# Patient Record
Sex: FEMALE | Race: WHITE | NOT HISPANIC OR LATINO | Employment: FULL TIME | URBAN - METROPOLITAN AREA
[De-identification: names, ages, dates, MRNs, and addresses within clinical notes are randomized per-mention and may not be internally consistent; named-entity substitution may affect disease eponyms.]

---

## 2023-11-04 PROBLEM — R92.2 DENSE BREASTS: Status: ACTIVE | Noted: 2023-11-04

## 2023-11-04 PROBLEM — D22.5 MELANOCYTIC NEVI OF TRUNK: Status: ACTIVE | Noted: 2018-03-05

## 2023-11-04 PROBLEM — R92.30 DENSE BREASTS: Status: ACTIVE | Noted: 2023-11-04

## 2023-11-04 PROBLEM — L81.4 OTHER MELANIN HYPERPIGMENTATION: Status: ACTIVE | Noted: 2018-03-05

## 2023-11-04 PROBLEM — N92.6 IRREGULAR MENSES: Status: ACTIVE | Noted: 2023-11-04

## 2023-11-04 PROBLEM — E55.9 VITAMIN D DEFICIENCY: Status: ACTIVE | Noted: 2023-11-04

## 2023-11-04 PROBLEM — D18.01 HEMANGIOMA OF SKIN AND SUBCUTANEOUS TISSUE: Status: ACTIVE | Noted: 2018-03-05

## 2023-11-04 PROBLEM — G47.33 OBSTRUCTIVE SLEEP APNEA: Status: ACTIVE | Noted: 2023-11-04

## 2023-11-04 PROBLEM — Z98.890 HISTORY OF COLPOSCOPY: Status: ACTIVE | Noted: 2023-11-04

## 2023-11-04 PROBLEM — H60.91 RIGHT OTITIS EXTERNA: Status: ACTIVE | Noted: 2023-11-04

## 2023-11-04 PROBLEM — D22.30 MELANOCYTIC NEVI OF UNSPECIFIED PART OF FACE: Status: ACTIVE | Noted: 2018-03-05

## 2023-11-04 PROBLEM — R87.610 PAP SMEAR ABNORMALITY OF CERVIX WITH ASCUS FAVORING BENIGN: Status: ACTIVE | Noted: 2023-11-04

## 2023-11-04 PROBLEM — H92.03 OTALGIA OF BOTH EARS: Status: ACTIVE | Noted: 2023-11-04

## 2023-11-04 PROBLEM — L82.1 OTHER SEBORRHEIC KERATOSIS: Status: ACTIVE | Noted: 2018-03-05

## 2023-11-04 PROBLEM — D22.62 MELANOCYTIC NEVI OF LEFT UPPER LIMB, INCLUDING SHOULDER: Status: ACTIVE | Noted: 2018-03-05

## 2023-11-04 PROBLEM — D22.72 MELANOCYTIC NEVI OF LEFT LOWER LIMB, INCLUDING HIP: Status: ACTIVE | Noted: 2018-03-05

## 2023-11-04 PROBLEM — L91.8 OTHER HYPERTROPHIC DISORDERS OF THE SKIN: Status: ACTIVE | Noted: 2018-03-05

## 2023-11-04 PROBLEM — H92.09 OTALGIA: Status: ACTIVE | Noted: 2023-11-04

## 2023-11-04 PROBLEM — B97.7 HPV IN FEMALE: Status: ACTIVE | Noted: 2023-11-04

## 2023-11-04 PROBLEM — H93.293 OTHER ABNORMAL AUDITORY PERCEPTIONS, BILATERAL: Status: ACTIVE | Noted: 2023-11-04

## 2023-11-04 PROBLEM — R23.2 HOT FLASHES: Status: ACTIVE | Noted: 2023-11-04

## 2023-11-04 PROBLEM — E61.1 LOW IRON: Status: ACTIVE | Noted: 2023-11-04

## 2023-11-04 PROBLEM — E66.9 OBESITY: Status: ACTIVE | Noted: 2023-11-04

## 2023-11-04 RX ORDER — SOD SULF/POT CHLORIDE/MAG SULF 1.479 G
TABLET ORAL
COMMUNITY
Start: 2022-09-12 | End: 2023-11-06 | Stop reason: WASHOUT

## 2023-11-06 ENCOUNTER — OFFICE VISIT (OUTPATIENT)
Dept: OBSTETRICS AND GYNECOLOGY | Facility: CLINIC | Age: 53
End: 2023-11-06
Payer: COMMERCIAL

## 2023-11-06 VITALS
DIASTOLIC BLOOD PRESSURE: 68 MMHG | HEIGHT: 61 IN | SYSTOLIC BLOOD PRESSURE: 106 MMHG | WEIGHT: 199 LBS | BODY MASS INDEX: 37.57 KG/M2

## 2023-11-06 DIAGNOSIS — Z12.4 CERVICAL CANCER SCREENING: ICD-10-CM

## 2023-11-06 DIAGNOSIS — Z12.31 VISIT FOR SCREENING MAMMOGRAM: ICD-10-CM

## 2023-11-06 DIAGNOSIS — Z01.419 WELL WOMAN EXAM WITH ROUTINE GYNECOLOGICAL EXAM: Primary | ICD-10-CM

## 2023-11-06 PROCEDURE — 1036F TOBACCO NON-USER: CPT | Performed by: NURSE PRACTITIONER

## 2023-11-06 PROCEDURE — 87624 HPV HI-RISK TYP POOLED RSLT: CPT

## 2023-11-06 PROCEDURE — 88175 CYTOPATH C/V AUTO FLUID REDO: CPT

## 2023-11-06 PROCEDURE — 88141 CYTOPATH C/V INTERPRET: CPT | Performed by: PATHOLOGY

## 2023-11-06 PROCEDURE — 99396 PREV VISIT EST AGE 40-64: CPT | Performed by: NURSE PRACTITIONER

## 2023-11-06 ASSESSMENT — ENCOUNTER SYMPTOMS
PSYCHIATRIC NEGATIVE: 1
NEUROLOGICAL NEGATIVE: 1
GASTROINTESTINAL NEGATIVE: 1
CARDIOVASCULAR NEGATIVE: 1
ENDOCRINE NEGATIVE: 1
RESPIRATORY NEGATIVE: 1
ALLERGIC/IMMUNOLOGIC NEGATIVE: 1
HEMATOLOGIC/LYMPHATIC NEGATIVE: 1
MUSCULOSKELETAL NEGATIVE: 1
EYES NEGATIVE: 1
UNEXPECTED WEIGHT CHANGE: 1
ENDOCRINE COMMENTS: +HOT FLASHES

## 2023-11-06 NOTE — PROGRESS NOTES
"Chief Complaint    Annual Exam        HPI    ANNUAL -NO CONCERNS    PAP -10/01/2020 NEG / HPV NEG  MAMMO - 2023  DEXA - NEVER  COLON - 11/15/2022  Last edited by Miesha Hair MA on 2023  8:59 AM.        53 y.o.  female presents for annual well woman exam.   Last menstrual cycle was in 2022.   Menopausal symptoms include hot flashes. She reports they are manageable and not affecting her daily living. She is not on hormone replacement therapy.  Denies any post-menopausal vaginal bleeding.  She is , currently sexually active. Denies dyspareunia.    She denies any breast changes.   H/O abnormal pap ASCUS, +HPV-16 with negative colposcopy in 10/2019. Last pap: 10/2020 and was negative and negative HPV.   Denies pelvic pain.   Denies abnormal vaginal discharge, itching, odor.   No urinary or bowel concerns. Colonoscopy: 2022 which showed polyps and is due to return at 3 years.   She is a non-smoker.   PMH: None  Family h/o breast cancer: None  Family h/o GYN cancer: None  Family h/o colon cancer: None  Works in IT, from home.     /68   Ht 1.549 m (5' 1\")   Wt 90.3 kg (199 lb)   LMP 2022   BMI 37.60 kg/m²      Review of Systems   Constitutional:  Positive for unexpected weight change (weight gain).   HENT: Negative.     Eyes: Negative.    Respiratory: Negative.     Cardiovascular: Negative.    Gastrointestinal: Negative.    Endocrine: Negative.         +hot flashes   Genitourinary: Negative.    Musculoskeletal: Negative.    Skin: Negative.    Allergic/Immunologic: Negative.    Neurological: Negative.    Hematological: Negative.    Psychiatric/Behavioral: Negative.     All other systems reviewed and are negative.       Physical Exam  Constitutional:       Appearance: Normal appearance.   HENT:      Head: Normocephalic.      Nose: Nose normal.   Cardiovascular:      Rate and Rhythm: Normal rate and regular rhythm.   Pulmonary:      Effort: Pulmonary effort is normal.      " Breath sounds: Normal breath sounds.   Chest:   Breasts:     Right: Normal.      Left: Normal.   Abdominal:      General: Abdomen is flat.      Palpations: Abdomen is soft.   Genitourinary:     General: Normal vulva.      Vagina: Normal.      Cervix: Normal.      Uterus: Normal.       Adnexa: Right adnexa normal and left adnexa normal.      Rectum: Normal.      Comments: Pap collected today  Musculoskeletal:         General: Normal range of motion.      Cervical back: Normal range of motion and neck supple.   Skin:     General: Skin is warm and dry.   Neurological:      Mental Status: She is alert.   Psychiatric:         Mood and Affect: Mood normal.         Behavior: Behavior normal.     1. Well woman exam with routine gynecological exam  -Pap test w/HPV testing collected today.   -Mammogram ordered. Self breast awareness exams reviewed.  -Colonoscopy is up to date.   -Advised yearly well woman exams.   -Follow up sooner if needed.     2. Visit for screening mammogram  - BI mammo bilateral screening tomosynthesis; Future    3. Cervical cancer screening  - THINPREP PAP

## 2023-11-21 LAB
CYTOLOGY CMNT CVX/VAG CYTO-IMP: NORMAL
HPV HR 12 DNA GENITAL QL NAA+PROBE: NEGATIVE
HPV HR GENOTYPES PNL CVX NAA+PROBE: POSITIVE
HPV16 DNA SPEC QL NAA+PROBE: POSITIVE
HPV18 DNA SPEC QL NAA+PROBE: NEGATIVE
LAB AP HPV GENOTYPE QUESTION: YES
LAB AP HPV HR: NORMAL
LABORATORY COMMENT REPORT: NORMAL
LMP START DATE: NORMAL
PATH REPORT.TOTAL CANCER: NORMAL

## 2023-12-07 ENCOUNTER — OFFICE VISIT (OUTPATIENT)
Dept: OBSTETRICS AND GYNECOLOGY | Facility: CLINIC | Age: 53
End: 2023-12-07
Payer: COMMERCIAL

## 2023-12-07 VITALS — DIASTOLIC BLOOD PRESSURE: 66 MMHG | SYSTOLIC BLOOD PRESSURE: 110 MMHG | BODY MASS INDEX: 37.41 KG/M2 | WEIGHT: 198 LBS

## 2023-12-07 DIAGNOSIS — B97.7 HPV IN FEMALE: Primary | ICD-10-CM

## 2023-12-07 PROCEDURE — 1036F TOBACCO NON-USER: CPT | Performed by: OBSTETRICS & GYNECOLOGY

## 2023-12-07 PROCEDURE — 88305 TISSUE EXAM BY PATHOLOGIST: CPT

## 2023-12-07 PROCEDURE — 88305 TISSUE EXAM BY PATHOLOGIST: CPT | Performed by: PATHOLOGY

## 2023-12-07 PROCEDURE — 57456 ENDOCERV CURETTAGE W/SCOPE: CPT | Performed by: OBSTETRICS & GYNECOLOGY

## 2023-12-07 NOTE — PROGRESS NOTES
Patient ID: Mare Hayes is a 53 y.o. female.    Colposcopy    Date/Time: 12/7/2023 12:04 PM    Performed by: Rebecca Valadez DO  Authorized by: Rebecca Valadez DO    Procedure location: cervix    Consent:     Patient questions answered: yes      Risks and benefits of the procedure and its alternatives discussed: yes      Procedural risks discussed:  Bleeding and infection    Consent obtained:  Written    Consent given by:  Patient  Indication:     Cervical indication(s): high-risk HPV positive    Pre-procedure:     Premeds:  Ibuprofen    Speculum was placed in the vagina: yes      Prep solution(s): acetic acid    Procedure:     Colposcopy with: endocervical curettage      Biopsy taken: no      Cervix visibility: fully visualized      SCJ visibility: fully visualized      Lesion visualized: fully visualized      Cervical impression: normal/benign    Post-procedure:     Patient tolerance of procedure:  Patient tolerated the procedure well with no immediate complications    Instructions and paperwork completed: yes      Educational handouts given: yes    Rebecca Valadez DO

## 2023-12-07 NOTE — PATIENT INSTRUCTIONS
Visit for Colposcopy:  You were seen today for colposcopy for abnormal pap smear  Colposcopy is a type of examination that allows for better visualization of abnormal cervical cells, generally caused by infection with Human Papilloma Virus (HPV)  You may have had a biopsy done to better diagnose cervical abnormalities and plan for future management. If so, you may have some spotting to light vaginal bleeding for a few days. Sometimes a medication called Monsel's is used after cervical biopsies to stop bleeding, and this medication often causes a discharge that looks like coffee grounds  If a biopsy was done during your colposcopy you will receive your results by phone/MyChart  Recommendations for follow up pap smears/treatment is dependent on colposcopy/biopsy findings  Monitor for signs of infection and call the office for any fever, chills, severe/worsening pelvic pain, foul smelling vaginal discharge. [phone] [place] [phone]

## 2023-12-11 ENCOUNTER — OFFICE VISIT (OUTPATIENT)
Dept: PRIMARY CARE | Facility: CLINIC | Age: 53
End: 2023-12-11
Payer: COMMERCIAL

## 2023-12-11 VITALS
RESPIRATION RATE: 18 BRPM | BODY MASS INDEX: 37.61 KG/M2 | HEIGHT: 61 IN | DIASTOLIC BLOOD PRESSURE: 72 MMHG | SYSTOLIC BLOOD PRESSURE: 100 MMHG | HEART RATE: 73 BPM | WEIGHT: 199.2 LBS | OXYGEN SATURATION: 98 %

## 2023-12-11 DIAGNOSIS — F32.89 OTHER DEPRESSION: ICD-10-CM

## 2023-12-11 DIAGNOSIS — Z13.220 LIPID SCREENING: ICD-10-CM

## 2023-12-11 DIAGNOSIS — E53.8 VITAMIN B 12 DEFICIENCY: ICD-10-CM

## 2023-12-11 DIAGNOSIS — Z00.00 WELLNESS EXAMINATION: Primary | ICD-10-CM

## 2023-12-11 DIAGNOSIS — E66.9 CLASS 2 OBESITY WITHOUT SERIOUS COMORBIDITY WITH BODY MASS INDEX (BMI) OF 37.0 TO 37.9 IN ADULT, UNSPECIFIED OBESITY TYPE: ICD-10-CM

## 2023-12-11 DIAGNOSIS — E55.9 VITAMIN D DEFICIENCY: ICD-10-CM

## 2023-12-11 DIAGNOSIS — R63.5 WEIGHT GAIN: ICD-10-CM

## 2023-12-11 PROBLEM — H93.293 OTHER ABNORMAL AUDITORY PERCEPTIONS, BILATERAL: Status: RESOLVED | Noted: 2023-11-04 | Resolved: 2023-12-11

## 2023-12-11 PROBLEM — H60.91 RIGHT OTITIS EXTERNA: Status: RESOLVED | Noted: 2023-11-04 | Resolved: 2023-12-11

## 2023-12-11 PROBLEM — H92.03 OTALGIA OF BOTH EARS: Status: RESOLVED | Noted: 2023-11-04 | Resolved: 2023-12-11

## 2023-12-11 PROBLEM — R23.2 HOT FLASHES: Status: RESOLVED | Noted: 2023-11-04 | Resolved: 2023-12-11

## 2023-12-11 PROBLEM — H92.09 OTALGIA: Status: RESOLVED | Noted: 2023-11-04 | Resolved: 2023-12-11

## 2023-12-11 PROCEDURE — 99214 OFFICE O/P EST MOD 30 MIN: CPT | Performed by: NURSE PRACTITIONER

## 2023-12-11 PROCEDURE — 3008F BODY MASS INDEX DOCD: CPT | Performed by: NURSE PRACTITIONER

## 2023-12-11 PROCEDURE — 1036F TOBACCO NON-USER: CPT | Performed by: NURSE PRACTITIONER

## 2023-12-11 RX ORDER — SERTRALINE HYDROCHLORIDE 25 MG/1
25 TABLET, FILM COATED ORAL DAILY
Qty: 30 TABLET | Refills: 1 | Status: SHIPPED | OUTPATIENT
Start: 2023-12-11 | End: 2024-03-21

## 2023-12-11 NOTE — PROGRESS NOTES
"Subjective   Patient ID: Mare Hayes is a 53 y.o. female who presents for Annual Exam (Patient in today for annual CPE, reports no concerns at this time. ).    53-year-old female here for yearly follow-up.  During her visit she started to cry.  She states she has been very irritable and depressed.  She reviewed some of her history with me,  from  and is secondary to him \"cheating on her \".  His new wife recently  from oral cancer.  Patient recently underwent a colposcopy for abnormal Pap positive for HPV.  Now the patient has been upset worried that something is going to come back abnormal especially given the history of her  cheating on her.  Having a hard time looking past this right now  Colonoscopy done for colon cancer screening 2022.  Recommended for 3-year follow-up secondary to abnormal polyp  Mamm-ordered by gynecology for yearly breast cancer screening  Anxiety/depression. With depression being the current issue.  She denies feelings of self-harm or harm to others.  But having a difficult time handling her current health situation  B12- 342 in -not on supplement  Vit D - 40 in            Review of Systems   All other systems reviewed and are negative.      Objective   /72   Pulse 73   Resp 18   Ht 1.549 m (5' 1\")   Wt 90.4 kg (199 lb 3.2 oz)   LMP 2022   SpO2 98%   BMI 37.64 kg/m²     Physical Exam  Vitals and nursing note reviewed.   Constitutional:       General: She is not in acute distress.     Appearance: Normal appearance. She is normal weight.   HENT:      Head: Normocephalic and atraumatic.   Eyes:      Extraocular Movements: Extraocular movements intact.      Conjunctiva/sclera: Conjunctivae normal.      Pupils: Pupils are equal, round, and reactive to light.   Neck:      Vascular: No carotid bruit.   Cardiovascular:      Rate and Rhythm: Normal rate and regular rhythm.      Pulses: Normal pulses.      Heart sounds: Normal heart " sounds.   Pulmonary:      Effort: Pulmonary effort is normal.      Breath sounds: Normal breath sounds.   Abdominal:      General: Bowel sounds are normal. There is no distension.      Palpations: Abdomen is soft.      Tenderness: There is no abdominal tenderness.   Musculoskeletal:         General: Normal range of motion.      Cervical back: Normal range of motion and neck supple.      Right lower leg: No edema.      Left lower leg: No edema.   Lymphadenopathy:      Cervical: No cervical adenopathy.   Skin:     General: Skin is warm and dry.      Capillary Refill: Capillary refill takes less than 2 seconds.   Neurological:      General: No focal deficit present.      Mental Status: She is alert and oriented to person, place, and time.      Motor: No weakness.   Psychiatric:         Behavior: Behavior normal.      Comments: Slightly irritable during the first half of our visit.  Then she started to open up.  Started to cry when she was talking about her health concerns         Assessment/Plan   Diagnoses and all orders for this visit:  Wellness examination  Comments:  Update routine blood work  Orders:  -     Comprehensive Metabolic Panel; Future  -     CBC and Auto Differential; Future  -     Follow Up In Primary Care - Other; Future  Other depression  Comments:  Risk versus benefit discussed possible medications reviewed.  Start sertraline 25 mg p.o. daily follow-up in 1 month  Orders:  -     sertraline (Zoloft) 25 mg tablet; Take 1 tablet (25 mg) by mouth once daily.  -     Thyroid Stimulating Hormone; Future  -     Thyroxine, Free; Future  -     Vitamin D 25-Hydroxy,Total (for eval of Vitamin D levels); Future  Weight gain  Comments:  We talked about getting her mood under control.  Use sertraline for depression has minimal effect on weight gain  Orders:  -     Thyroid Stimulating Hormone; Future  -     Thyroxine, Free; Future  Lipid screening  Comments:  Fasting lipid panel  Orders:  -     Lipid Panel;  Future  Vitamin D deficiency  Comments:  Make sure you are taking vitamin D 5000 units p.o. daily with food  Class 2 obesity without serious comorbidity with body mass index (BMI) of 37.0 to 37.9 in adult, unspecified obesity type  Vitamin B 12 deficiency  Comments:  B12 level is too low.  Should be over 600.  Add daily sublingual vitamin B12 supplement approximately 1000 mcg a day

## 2023-12-11 NOTE — PATIENT INSTRUCTIONS
The Plate Method- this will help with portion control  Protein 50% of your body weight in ounces.   Depression- Start sertraline 25 mg daily  Magnesium Glycinate 1-2 times a day for hot flashes, anxiety.  [employer]   It was nice to meet you!

## 2023-12-15 LAB
LABORATORY COMMENT REPORT: NORMAL
PATH REPORT.FINAL DX SPEC: NORMAL
PATH REPORT.GROSS SPEC: NORMAL
PATH REPORT.RELEVANT HX SPEC: NORMAL
PATH REPORT.TOTAL CANCER: NORMAL

## 2024-01-05 ENCOUNTER — OFFICE VISIT (OUTPATIENT)
Dept: OBSTETRICS AND GYNECOLOGY | Facility: CLINIC | Age: 54
End: 2024-01-05
Payer: COMMERCIAL

## 2024-01-05 VITALS
DIASTOLIC BLOOD PRESSURE: 82 MMHG | BODY MASS INDEX: 37.57 KG/M2 | SYSTOLIC BLOOD PRESSURE: 112 MMHG | HEIGHT: 61 IN | WEIGHT: 199 LBS

## 2024-01-05 DIAGNOSIS — N30.00 ACUTE CYSTITIS WITHOUT HEMATURIA: ICD-10-CM

## 2024-01-05 DIAGNOSIS — R39.15 URINARY URGENCY: ICD-10-CM

## 2024-01-05 DIAGNOSIS — N93.9 ABNORMAL UTERINE BLEEDING (AUB): Primary | ICD-10-CM

## 2024-01-05 LAB
POC APPEARANCE, URINE: CLEAR
POC BILIRUBIN, URINE: NEGATIVE
POC BLOOD, URINE: ABNORMAL
POC COLOR, URINE: YELLOW
POC GLUCOSE, URINE: NEGATIVE MG/DL
POC KETONES, URINE: NEGATIVE MG/DL
POC LEUKOCYTES, URINE: ABNORMAL
POC NITRITE,URINE: NEGATIVE
POC PH, URINE: 7 PH
POC PROTEIN, URINE: ABNORMAL MG/DL
POC SPECIFIC GRAVITY, URINE: 1.01
POC UROBILINOGEN, URINE: 0.2 EU/DL

## 2024-01-05 PROCEDURE — 3008F BODY MASS INDEX DOCD: CPT | Performed by: NURSE PRACTITIONER

## 2024-01-05 PROCEDURE — 99213 OFFICE O/P EST LOW 20 MIN: CPT | Performed by: NURSE PRACTITIONER

## 2024-01-05 PROCEDURE — 87086 URINE CULTURE/COLONY COUNT: CPT

## 2024-01-05 PROCEDURE — 1036F TOBACCO NON-USER: CPT | Performed by: NURSE PRACTITIONER

## 2024-01-05 PROCEDURE — 81002 URINALYSIS NONAUTO W/O SCOPE: CPT | Performed by: NURSE PRACTITIONER

## 2024-01-05 NOTE — PROGRESS NOTES
"Chief Complaint    Vaginal Bleeding        HPI    HAD NOT HAD A PERIOD IN OVER 1 YEAR 2022 AND THEN STARTED BLEEDING AFTER PROCEDURE COLPO NO SPECIMENS TAKEN 23 STARTED BLEEDING BRIGHT RED BLOOD - NO BLEEDING TODAAY   Last edited by Miesha Hair MA on 2024 10:31 AM.         53 y.o.  female who presents for evaluation of abnormal bleeding.  She had went just about 1 year with no period.   Started bleeding on . Has been having daily bleeding since.  Light flow, but bright red. Only needing panty liner.   Reports pelvic pressure and urinary urgency.   Denies abnormal vaginal discharge, itching, or odor.   She is sexually active with . Denies dyspareunia.  She had recent CBC, TSH with PCP which were normal.  Pap in 2023 ASCUS/+HRHPV. Colposcopy on 2023 with Dr. Lopes. ECC negative.   PMH: None  No family h/o GYN related cancers or disorders.     /82   Ht 1.549 m (5' 1\")   Wt 90.3 kg (199 lb)   LMP 2022   BMI 37.60 kg/m²      Review of Systems   Genitourinary:  Positive for pelvic pain, urgency and vaginal bleeding.   All other systems reviewed and are negative.       Physical Exam  Constitutional:       Appearance: Normal appearance.   HENT:      Head: Normocephalic.   Pulmonary:      Effort: Pulmonary effort is normal.   Genitourinary:     General: Normal vulva.      Vagina: Bleeding (Scant) present.      Cervix: Lesion (Small polyp) present.      Uterus: Normal.       Adnexa: Right adnexa normal and left adnexa normal.   Musculoskeletal:         General: Normal range of motion.      Cervical back: Normal range of motion and neck supple.   Skin:     General: Skin is warm and dry.   Neurological:      Mental Status: She is alert.   Psychiatric:         Mood and Affect: Mood normal.         Behavior: Behavior normal.     1. Abnormal uterine bleeding (AUB)  -Discussed possible etiologies of abnormal bleeding in detail including ovulatory " dysfunction/perimenopause, endocrine disorders, hyperplasia, endometrial polyps, malignancy.   -Recent normal CBC, TSH. Prolactin ordered today.     Prolactin; Future  -Ordered: US PELVIS TRANSABDOMINAL WITH TRANSVAGINAL; Future  -Will notify of results.    -Discussed possible need for endometrial biopsy for further evaluation if needed.     2. Urinary urgency  -POCT UA (nonautomated) manually resulted   -Collected: Urine culture  -Will notify of results.

## 2024-01-07 LAB — BACTERIA UR CULT: ABNORMAL

## 2024-01-08 RX ORDER — CEPHALEXIN 500 MG/1
500 CAPSULE ORAL 4 TIMES DAILY
Qty: 20 CAPSULE | Refills: 0 | Status: SHIPPED | OUTPATIENT
Start: 2024-01-08 | End: 2024-01-13

## 2024-01-09 ENCOUNTER — ANCILLARY PROCEDURE (OUTPATIENT)
Dept: RADIOLOGY | Facility: CLINIC | Age: 54
End: 2024-01-09
Payer: COMMERCIAL

## 2024-01-09 DIAGNOSIS — N93.9 ABNORMAL UTERINE BLEEDING (AUB): ICD-10-CM

## 2024-01-09 PROCEDURE — 76856 US EXAM PELVIC COMPLETE: CPT

## 2024-01-09 PROCEDURE — 76830 TRANSVAGINAL US NON-OB: CPT | Performed by: RADIOLOGY

## 2024-01-09 PROCEDURE — 76856 US EXAM PELVIC COMPLETE: CPT | Performed by: RADIOLOGY

## 2024-01-11 ENCOUNTER — APPOINTMENT (OUTPATIENT)
Dept: PRIMARY CARE | Facility: CLINIC | Age: 54
End: 2024-01-11
Payer: COMMERCIAL

## 2024-01-25 ENCOUNTER — HOSPITAL ENCOUNTER (OUTPATIENT)
Dept: RADIOLOGY | Facility: CLINIC | Age: 54
Discharge: HOME | End: 2024-01-25
Payer: COMMERCIAL

## 2024-01-25 DIAGNOSIS — Z12.31 VISIT FOR SCREENING MAMMOGRAM: ICD-10-CM

## 2024-01-25 PROCEDURE — 77063 BREAST TOMOSYNTHESIS BI: CPT | Performed by: RADIOLOGY

## 2024-01-25 PROCEDURE — 77067 SCR MAMMO BI INCL CAD: CPT

## 2024-01-25 PROCEDURE — 77067 SCR MAMMO BI INCL CAD: CPT | Performed by: RADIOLOGY

## 2024-02-02 ENCOUNTER — PROCEDURE VISIT (OUTPATIENT)
Dept: OBSTETRICS AND GYNECOLOGY | Facility: CLINIC | Age: 54
End: 2024-02-02
Payer: COMMERCIAL

## 2024-02-02 VITALS
HEIGHT: 61 IN | WEIGHT: 201 LBS | SYSTOLIC BLOOD PRESSURE: 110 MMHG | BODY MASS INDEX: 37.95 KG/M2 | DIASTOLIC BLOOD PRESSURE: 74 MMHG

## 2024-02-02 DIAGNOSIS — N93.9 ABNORMAL UTERINE BLEEDING (AUB): Primary | ICD-10-CM

## 2024-02-02 DIAGNOSIS — Z32.02 PREGNANCY TEST NEGATIVE: ICD-10-CM

## 2024-02-02 LAB — PREGNANCY TEST URINE, POC: NEGATIVE

## 2024-02-02 PROCEDURE — 99213 OFFICE O/P EST LOW 20 MIN: CPT | Performed by: OBSTETRICS & GYNECOLOGY

## 2024-02-02 PROCEDURE — 81025 URINE PREGNANCY TEST: CPT | Performed by: OBSTETRICS & GYNECOLOGY

## 2024-02-02 NOTE — PROGRESS NOTES
EDGAR Hayes is a 53 y.o.  with a history of AUB who is presenting today for AUB.  She is doing well today.   Had unprotected sex in the past 2 weeks.   She is not taking birth control.  Inquired if hysterectomy would get rid of cervix in terms of HPV.    AUB Hx: Conchita Martines's note from 01/05/2024  She had went just about 1 year with no period.   Started bleeding on 12/21. Has been having daily bleeding since.  Light flow, but bright red. Only needing panty liner.   Reports pelvic pressure and urinary urgency.   Denies abnormal vaginal discharge, itching, or odor.   She is sexually active with . Denies dyspareunia.  She had recent CBC, TSH with PCP which were normal.  Pap in 11/2023 ASCUS/+HRHPV. Colposcopy on 12/07/2023 with Dr. Lopes. ECC negative.   PMH: None  No family h/o GYN related cancers or disorders.        TVUS, 1/2024 reviewed:  Uterus 8.8 x 5.8 x 5.2 cm.  Two small fibroids approximately 1 to1.5cm.   Possible adenomyosis upon the uterine area.  EMS 7mm.  Adnexa negative bilaterally.    12/2023 CBC wnl, hgb 15.3, TSH wnl.    Review of Systems   All other systems reviewed and are negative.    VITAL SIGNS  LMP 12/11/2022     Assessment/Plan   Discussed TVUS results and adenomyosis.  EMB deferred today. Patient had unprotected sex within the past 2 weeks.  Counseled on AUB and need for EMB to rule out cancer and precancer etiology    Patient to schedule follow up for EMB.  No unprotected sex for > 2 wks prior to procedure.  Recommended taking ibuprofen prior to procedure.      Scribe Attestation  By signing my name below, Briana CHO Scribe   attest that this documentation has been prepared under the direction and in the presence of Nickolas Soto MD.  Scribe Attestation  By signing my name below, Yamel CHO Scribe attest that this documentation has been prepared under the direction and in the presence of Nickolas Soto MD.

## 2024-02-23 ENCOUNTER — APPOINTMENT (OUTPATIENT)
Dept: OBSTETRICS AND GYNECOLOGY | Facility: CLINIC | Age: 54
End: 2024-02-23
Payer: COMMERCIAL

## 2024-03-11 ENCOUNTER — PROCEDURE VISIT (OUTPATIENT)
Dept: OBSTETRICS AND GYNECOLOGY | Facility: CLINIC | Age: 54
End: 2024-03-11
Payer: COMMERCIAL

## 2024-03-11 VITALS
SYSTOLIC BLOOD PRESSURE: 104 MMHG | BODY MASS INDEX: 37.95 KG/M2 | WEIGHT: 201 LBS | HEIGHT: 61 IN | DIASTOLIC BLOOD PRESSURE: 78 MMHG

## 2024-03-11 DIAGNOSIS — N93.9 ABNORMAL UTERINE BLEEDING (AUB): ICD-10-CM

## 2024-03-11 DIAGNOSIS — Z32.02 PREGNANCY TEST NEGATIVE: ICD-10-CM

## 2024-03-11 LAB — PREGNANCY TEST URINE, POC: NEGATIVE

## 2024-03-11 PROCEDURE — 36415 COLL VENOUS BLD VENIPUNCTURE: CPT

## 2024-03-11 PROCEDURE — 88305 TISSUE EXAM BY PATHOLOGIST: CPT | Performed by: PATHOLOGY

## 2024-03-11 PROCEDURE — 81025 URINE PREGNANCY TEST: CPT | Performed by: OBSTETRICS & GYNECOLOGY

## 2024-03-11 NOTE — PROGRESS NOTES
"HPI  Mare Hayes is a 53 y.o.  with a history of AUB who is presenting today for follow-up.  No new complaints today  No acute concerns.     AUB hx: note by Conchita Martines on 01/05/2024  She had went just about 1 year with no period.   Started bleeding on 12/21. Has been having daily bleeding since.  Light flow, but bright red. Only needing panty liner.   Reports pelvic pressure and urinary urgency.   Denies abnormal vaginal discharge, itching, or odor.   She is sexually active with . Denies dyspareunia.  She had recent CBC, TSH with PCP which were normal.  Pap in 11/2023 ASCUS/+HRHPV. Colposcopy on 12/07/2023 with Dr. Lopes. ECC negative. H/O abnormal pap ASCUS, +HPV-16 with negative colposcopy in 10/2019. Last pap: 10/2020 and was negative and negative HPV.    PMH: None  No family h/o GYN related cancers or disorders.     Last seen by me on 10/28/2019 for Colposcopy:  PAP 9/30/19 ASCUS/ +HPV (16)   No h/o +HPV pap. Previous paps were ASCUS or negative.   Feeling well today physically. Having a difficult time with this new HPV diagnosis due to social situation preceding this.       ROS    VITAL SIGNS  /78   Ht 1.549 m (5' 1\")   Wt 91.2 kg (201 lb)   LMP 12/11/2022   BMI 37.98 kg/m²     Endometrial biopsy    Date/Time: 3/11/2024 9:42 AM    Performed by: Nickolas Soto MD  Authorized by: Nickolas Soto MD    Consent:     Consent obtained: verbal and written    Consent given by: patient    Patient agrees, verbalizes understanding, and wants to proceed: yes      Procedure explained and questions answered to patient or proxy's satisfaction: yes    Indications:     Indications: abnormal uterine bleeding    Pre-procedure:     Urine pregnancy test: negative    Procedure:     A bimanual exam was performed: yes      Uterus size: non-gravid    Prepped with: Betadine    Tenaculum used: yes      A local block was performed: yes      Block method: intracervical      Local anesthetic: lidocaine 1% w/o " epi    Amount used (mL): 1    Number of passes: 3  Findings:     Cervix: normal      Specimen collected: specimen collected and sent to pathology      Patient tolerance: tolerated well, no immediate complications        Assessment/Plan   -Procedure performed and tolerated well by patient  -Await pathology results  -RTC in 2-3 weeks for further discussion of management        Scribe Attestation  By signing my name below, Briana CHO, Scribe   attest that this documentation has been prepared under the direction and in the presence of Nickolas Soto MD.

## 2024-03-20 ENCOUNTER — TELEPHONE (OUTPATIENT)
Dept: OBSTETRICS AND GYNECOLOGY | Facility: CLINIC | Age: 54
End: 2024-03-20

## 2024-03-21 ENCOUNTER — PATIENT MESSAGE (OUTPATIENT)
Dept: PRIMARY CARE | Facility: CLINIC | Age: 54
End: 2024-03-21
Payer: COMMERCIAL

## 2024-04-22 ENCOUNTER — OFFICE VISIT (OUTPATIENT)
Dept: OBSTETRICS AND GYNECOLOGY | Facility: CLINIC | Age: 54
End: 2024-04-22
Payer: COMMERCIAL

## 2024-04-22 ENCOUNTER — PREP FOR PROCEDURE (OUTPATIENT)
Dept: OBSTETRICS AND GYNECOLOGY | Facility: CLINIC | Age: 54
End: 2024-04-22

## 2024-04-22 VITALS — BODY MASS INDEX: 38.17 KG/M2 | SYSTOLIC BLOOD PRESSURE: 114 MMHG | WEIGHT: 202 LBS | DIASTOLIC BLOOD PRESSURE: 64 MMHG

## 2024-04-22 DIAGNOSIS — N93.9 ABNORMAL UTERINE BLEEDING (AUB): Primary | ICD-10-CM

## 2024-04-22 PROCEDURE — 1036F TOBACCO NON-USER: CPT | Performed by: OBSTETRICS & GYNECOLOGY

## 2024-04-22 PROCEDURE — 99214 OFFICE O/P EST MOD 30 MIN: CPT | Performed by: OBSTETRICS & GYNECOLOGY

## 2024-04-22 PROCEDURE — 3008F BODY MASS INDEX DOCD: CPT | Performed by: OBSTETRICS & GYNECOLOGY

## 2024-04-22 NOTE — H&P (VIEW-ONLY)
Pre-op History and Physical  Patient is a 53 y.o. female scheduled for hysteroscopy, D&C. Indications for procedure are AUB. Notably, just shy of PMB by weeks to a month.    TVUS with EMS 7mm. Small fibroids and possible adenomyosis     EMB in office normal benign surface endometrium though scant for further evaluation    Discussed Blood/Blood Products: yes    Menstrual History:  OB History          4    Para   3    Term   3            AB   1    Living   3         SAB   1    IAB        Ectopic        Multiple        Live Births   3              Patient's last menstrual period was 2022.       For hysteroscopy, D&C  Proceed to OR  Need HCG

## 2024-04-22 NOTE — PROGRESS NOTES
EDGAR Hayes is a 53 y.o.  with a history of AUB who is presenting today for a pre-surgical consult.  No new complaints    EMB Pathology: 03/11/2024  Benign endometrial surface epithelium, too scant for further evaluation.     TVUS 01/2024:  Uterus: Measuring 8.8 x 5.8 x 5.2 cm. 1.2 cm anterior lower uterine body fibroid. 1.3 cm posterior fibroid.  EMS: Shadowing could represent adenomyosis  Adnexa negative bilaterally     AUB hx: note by Conchita Martines on 01/05/2024  She had went just about 1 year with no period.   Started bleeding on 12/21. Has been having daily bleeding since.  Light flow, but bright red. Only needing panty liner.   Reports pelvic pressure and urinary urgency.   Denies abnormal vaginal discharge, itching, or odor.   She is sexually active with . Denies dyspareunia.  She had recent CBC, TSH with PCP which were normal.  Pap in 11/2023 ASCUS/+HRHPV. Colposcopy on 12/07/2023 with Dr. Lopes. ECC negative. H/O abnormal pap ASCUS, +HPV-16 with negative colposcopy in 10/2019. Last pap: 10/2020 and was negative and negative HPV.    PMH: None  No family h/o GYN related cancers or disorders.      Last seen by me on 10/28/2019 for Colposcopy:  PAP 9/30/19 ASCUS/ +HPV (16)   No h/o +HPV pap. Previous paps were ASCUS or negative.   Feeling well today physically. Having a difficult time with this new HPV diagnosis due to social situation preceding this.      ROS    VITAL SIGNS  /64   Wt 91.6 kg (202 lb)   LMP 12/11/2022   BMI 38.17 kg/m²     Physical Exam  Constitutional:       Appearance: Normal appearance.   HENT:      Head: Normocephalic and atraumatic.   Eyes:      Extraocular Movements: Extraocular movements intact.      Conjunctiva/sclera: Conjunctivae normal.      Pupils: Pupils are equal, round, and reactive to light.   Pulmonary:      Effort: Pulmonary effort is normal.   Skin:     General: Skin is dry.   Neurological:      General: No focal deficit present.      Mental  Status: She is alert.   Psychiatric:         Mood and Affect: Mood normal.         Behavior: Behavior normal.         Thought Content: Thought content normal.         Judgment: Judgment normal.       Assessment/Plan   AUB:  -Reviewed pathology findings with the patient, incomplete characterization  -Recommend hysteroscopy, D&C for further characterization  -Surgical consent was signed today for Hysteroscopy D/C.   - No unprotected sex for > 2 wks prior to procedure.   -Patient requires PAT  -prep for procedure    Scribe Attestation  By signing my name below, IBriana, Scribnita   attest that this documentation has been prepared under the direction and in the presence of Nickolas Soto MD.

## 2024-04-23 ENCOUNTER — TELEPHONE (OUTPATIENT)
Dept: OBSTETRICS AND GYNECOLOGY | Facility: HOSPITAL | Age: 54
End: 2024-04-23

## 2024-05-08 ENCOUNTER — PRE-ADMISSION TESTING (OUTPATIENT)
Dept: PREADMISSION TESTING | Facility: HOSPITAL | Age: 54
End: 2024-05-08
Payer: COMMERCIAL

## 2024-05-08 VITALS
HEART RATE: 72 BPM | DIASTOLIC BLOOD PRESSURE: 92 MMHG | OXYGEN SATURATION: 94 % | BODY MASS INDEX: 38.29 KG/M2 | TEMPERATURE: 97.2 F | SYSTOLIC BLOOD PRESSURE: 127 MMHG | RESPIRATION RATE: 18 BRPM | WEIGHT: 202.82 LBS | HEIGHT: 61 IN

## 2024-05-08 DIAGNOSIS — Z01.818 ENCOUNTER FOR PREADMISSION TESTING: Primary | ICD-10-CM

## 2024-05-08 DIAGNOSIS — N93.9 ABNORMAL UTERINE BLEEDING (AUB): ICD-10-CM

## 2024-05-08 LAB
ANION GAP SERPL CALC-SCNC: 11 MMOL/L (ref 10–20)
BUN SERPL-MCNC: 16 MG/DL (ref 6–23)
CALCIUM SERPL-MCNC: 9.9 MG/DL (ref 8.6–10.3)
CHLORIDE SERPL-SCNC: 103 MMOL/L (ref 98–107)
CO2 SERPL-SCNC: 28 MMOL/L (ref 21–32)
CREAT SERPL-MCNC: 0.8 MG/DL (ref 0.5–1.05)
EGFRCR SERPLBLD CKD-EPI 2021: 88 ML/MIN/1.73M*2
ERYTHROCYTE [DISTWIDTH] IN BLOOD BY AUTOMATED COUNT: 13.1 % (ref 11.5–14.5)
GLUCOSE SERPL-MCNC: 92 MG/DL (ref 74–99)
HCT VFR BLD AUTO: 47.3 % (ref 36–46)
HGB BLD-MCNC: 15.4 G/DL (ref 12–16)
MCH RBC QN AUTO: 29.6 PG (ref 26–34)
MCHC RBC AUTO-ENTMCNC: 32.6 G/DL (ref 32–36)
MCV RBC AUTO: 91 FL (ref 80–100)
NRBC BLD-RTO: 0 /100 WBCS (ref 0–0)
PLATELET # BLD AUTO: 327 X10*3/UL (ref 150–450)
POTASSIUM SERPL-SCNC: 4.8 MMOL/L (ref 3.5–5.3)
RBC # BLD AUTO: 5.21 X10*6/UL (ref 4–5.2)
SODIUM SERPL-SCNC: 137 MMOL/L (ref 136–145)
WBC # BLD AUTO: 5.6 X10*3/UL (ref 4.4–11.3)

## 2024-05-08 PROCEDURE — 99204 OFFICE O/P NEW MOD 45 MIN: CPT | Performed by: NURSE PRACTITIONER

## 2024-05-08 PROCEDURE — 85027 COMPLETE CBC AUTOMATED: CPT

## 2024-05-08 PROCEDURE — 36415 COLL VENOUS BLD VENIPUNCTURE: CPT

## 2024-05-08 PROCEDURE — 93005 ELECTROCARDIOGRAM TRACING: CPT

## 2024-05-08 PROCEDURE — 82374 ASSAY BLOOD CARBON DIOXIDE: CPT

## 2024-05-08 RX ORDER — ZINC SULFATE 50(220)MG
50 CAPSULE ORAL DAILY
COMMUNITY

## 2024-05-08 RX ORDER — CALCIUM ACETATE 667 MG/1
1334 CAPSULE ORAL 3 TIMES DAILY
COMMUNITY

## 2024-05-08 RX ORDER — FERROUS SULFATE, DRIED 160(50) MG
1 TABLET, EXTENDED RELEASE ORAL DAILY
COMMUNITY

## 2024-05-08 RX ORDER — BISMUTH SUBSALICYLATE 262 MG
1 TABLET,CHEWABLE ORAL DAILY
COMMUNITY

## 2024-05-08 ASSESSMENT — DUKE ACTIVITY SCORE INDEX (DASI)
CAN YOU PARTICIPATE IN MODERATE RECREATIONAL ACTIVITIES LIKE GOLF, BOWLING, DANCING, DOUBLES TENNIS OR THROWING A BASEBALL OR FOOTBALL: NO
CAN YOU CLIMB A FLIGHT OF STAIRS OR WALK UP A HILL: YES
CAN YOU DO MODERATE WORK AROUND THE HOUSE LIKE VACUUMING, SWEEPING FLOORS OR CARRYING GROCERIES: YES
CAN YOU DO LIGHT WORK AROUND THE HOUSE LIKE DUSTING OR WASHING DISHES: YES
CAN YOU DO HEAVY WORK AROUND THE HOUSE LIKE SCRUBBING FLOORS OR LIFTING AND MOVING HEAVY FURNITURE: YES
CAN YOU WALK INDOORS, SUCH AS AROUND YOUR HOUSE: YES
CAN YOU WALK INDOORS, SUCH AS AROUND YOUR HOUSE: YES
CAN YOU DO HEAVY WORK AROUND THE HOUSE LIKE SCRUBBING FLOORS OR LIFTING AND MOVING HEAVY FURNITURE: YES
CAN YOU PARTICIPATE IN STRENOUS SPORTS LIKE SWIMMING, SINGLES TENNIS, FOOTBALL, BASKETBALL, OR SKIING: NO
CAN YOU RUN A SHORT DISTANCE: YES
CAN YOU DO LIGHT WORK AROUND THE HOUSE LIKE DUSTING OR WASHING DISHES: YES
CAN YOU WALK A BLOCK OR TWO ON LEVEL GROUND: YES
CAN YOU TAKE CARE OF YOURSELF (EAT, DRESS, BATHE, OR USE TOILET): YES
CAN YOU CLIMB A FLIGHT OF STAIRS OR WALK UP A HILL: YES
CAN YOU PARTICIPATE IN MODERATE RECREATIONAL ACTIVITIES LIKE GOLF, BOWLING, DANCING, DOUBLES TENNIS OR THROWING A BASEBALL OR FOOTBALL: YES
CAN YOU TAKE CARE OF YOURSELF (EAT, DRESS, BATHE, OR USE TOILET): YES
CAN YOU WALK A BLOCK OR TWO ON LEVEL GROUND: YES
CAN YOU RUN A SHORT DISTANCE: YES
CAN YOU PARTICIPATE IN STRENOUS SPORTS LIKE SWIMMING, SINGLES TENNIS, FOOTBALL, BASKETBALL, OR SKIING: NO
CAN YOU HAVE SEXUAL RELATIONS: YES
CAN YOU DO YARD WORK LIKE RAKING LEAVES, WEEDING OR PUSHING A MOWER: YES
CAN YOU HAVE SEXUAL RELATIONS: YES
CAN YOU DO MODERATE WORK AROUND THE HOUSE LIKE VACUUMING, SWEEPING FLOORS OR CARRYING GROCERIES: YES
DASI METS SCORE: 8.2
TOTAL_SCORE: 44.7

## 2024-05-08 ASSESSMENT — LIFESTYLE VARIABLES
SMOKING_STATUS: NONSMOKER
SMOKING_STATUS: NONSMOKER

## 2024-05-08 NOTE — H&P (VIEW-ONLY)
CPM/PAT Evaluation       Name: Mare Hayes (Mare Hayes)  /Age: 1970/53 y.o.     In-Person       Chief Complaint: Abnormal Uterine Bleeding    HPI 52 y/o female with a history of AUB who is scheduled for a hysteroscopy and C&C on 5/15/24 with Dr. Soto. Presents today to [facility] for perioperative risk stratification.  She reports intermittent uterine bleeding after no menses x 1 year. States had some recent spotting in the past week. No pain or discomfort. Remote history of MONO with heavy menses.     No broken, loose, chipped, cracked teeth. Has lower partials but states will not wear to surgery.   No hearing aids.  No glasses except for driving.   No issues with anesthesia or difficult intubation in the past.     Past Medical History:   Diagnosis Date    Atypical squamous cells of undetermined significance on cytologic smear of cervix (ASC-US)     Pap smear abnormality of cervix with ASCUS favoring benign    Atypical squamous cells of undetermined significance on cytologic smear of cervix (ASC-US)     Pap smear abnormality of cervix with ASCUS favoring benign    Encounter for full-term uncomplicated delivery ([facility])      (spontaneous vaginal delivery)    Encounter for gynecological examination (general) (routine) without abnormal findings 2017    Encounter for gynecological examination without abnormal finding    Flushing 2020    Hot flashes    Hot flashes 2023    Iron deficiency 2019    Low iron    Meniere's disease, unspecified ear     Meniere's disease    Meniere's disease, unspecified ear 10/25/2013    Meniere's disease    Otalgia, bilateral 2022    Otalgia of both ears    Other abnormal auditory perceptions, bilateral 2022    Other abnormal auditory perceptions, bilateral    Personal history of other complications of pregnancy, childbirth and the puerperium     History of miscarriage    Personal history of other diseases of the female genital tract 2018     History of irregular menstrual cycles    Personal history of other diseases of the nervous system and sense organs     History of restless legs syndrome    Personal history of other diseases of the nervous system and sense organs 02/07/2022    History of ear pain    Personal history of other medical treatment 11/01/2017    History of screening mammography    Sleep apnea, unspecified     Mild sleep apnea    Unspecified otitis externa, right ear 11/03/2021    Right otitis externa    Varicella without complication     Varicella without complication       Past Surgical History:   Procedure Laterality Date    COLONOSCOPY      OTHER SURGICAL HISTORY  10/28/2019    Cervical biopsy procedure colposcopic       Patient  reports being sexually active and has had partner(s) who are male. She reports using the following method of birth control/protection: None.    Family History   Problem Relation Name Age of Onset    Hypertension Mother      Stroke Other Grandfather        Allergies   Allergen Reactions    Clindamycin Swelling    Penicillins Hives    Sulfa (Sulfonamide Antibiotics) Hives       Prior to Admission medications    Medication Sig Start Date End Date Taking? Authorizing Provider   calcium acetate (Phoslo) 667 mg capsule Take 2 capsules (1,334 mg) by mouth 3 times a day.    Historical Provider, MD   calcium carbonate-vitamin D3 500 mg-5 mcg (200 unit) tablet Take 1 tablet by mouth once daily.    Historical Provider, MD   multivitamin tablet Take 1 tablet by mouth once daily.    Historical Provider, MD   zinc sulfate (Zincate) 220 (50 Zn) MG capsule Take 1 capsule (50 mg of elemental zinc) by mouth once daily.    Historical Provider, MD MOSES ROSALES   Constitutional: Denies fever, chills, fatigue, weight loss/gain  HEENT: Denies ear ache, sore throat, nasal drainage  Eyes: Denies blurred or double vision  Respiratory: Denies cough, shortness or breath, wheezing  Cardiovascular: Denies chest pain, palpitations,  shortness of breath with exertion, edema  GI: Denies abdominal pain, nausea, vomiting, diarrhea, constipation, bloody stools  : Denies urinary burning, urgency, frequency, hematuria  Musculoskeletal: Denies back, neck, or joint pain; joint redness, swelling, or tenderness  Endocrine: Denies cold/heat intolerance, excessive thirst, excessive hunger  Neuro: Denies dizziness, lightheadedness, seizures, headaches  Psychiatric: Denies anxiety, depression, self-harm  Skin: Denies wounds, rashes, lesions  Hematologic: Denies easy bruising, easy bleeding, clotting disorder       Physical Exam  Constitutional:       Appearance: Normal appearance.   HENT:      Head: Normocephalic and atraumatic.      Nose: Nose normal.      Mouth/Throat:      Mouth: Mucous membranes are moist.      Pharynx: Oropharynx is clear.   Eyes:      Extraocular Movements: Extraocular movements intact.      Conjunctiva/sclera: Conjunctivae normal.   Cardiovascular:      Rate and Rhythm: Normal rate and regular rhythm.      Pulses: Normal pulses.      Heart sounds: Normal heart sounds.   Pulmonary:      Effort: Pulmonary effort is normal.      Breath sounds: Normal breath sounds.   Abdominal:      General: Bowel sounds are normal.      Palpations: Abdomen is soft.   Musculoskeletal:         General: Normal range of motion.      Cervical back: Normal range of motion and neck supple.   Skin:     General: Skin is warm and dry.   Neurological:      General: No focal deficit present.      Mental Status: She is alert and oriented to person, place, and time. Mental status is at baseline.   Psychiatric:         Mood and Affect: Mood normal.         Behavior: Behavior normal.         Thought Content: Thought content normal.          PAT AIRWAY:   Airway:     Mallampati::  II    Neck ROM::  Full      Visit Vitals  BP (!) 127/92   Pulse 72   Temp 36.2 °C (97.2 °F) (Temporal)   Resp 18       DASI Risk Score      Flowsheet Row Most Recent Value   DASI SCORE 44.7    METS Score (Will be calculated only when all the questions are answered) 8.2          Caprini DVT Assessment      Flowsheet Row Most Recent Value   DVT Score 6   Current Status Major surgery planned, including arthroscopic and laproscopic (1-2 hours)   Age 40-59 years   BMI 31-40 (Obesity)          Modified Frailty Index    No data to display       CHADS2 Stroke Risk  Current as of 9 minutes ago        N/A 3 to 100%: High Risk   2 to < 3%: Medium Risk   0 to < 2%: Low Risk     Last Change: N/A          This score determines the patient's risk of having a stroke if the patient has atrial fibrillation.        This score is not applicable to this patient. Components are not calculated.          Revised Cardiac Risk Index      Flowsheet Row Most Recent Value   Revised Cardiac Risk Calculator 0          Apfel Simplified Score      Flowsheet Row Most Recent Value   Apfel Simplified Score Calculator 2          Risk Analysis Index Results This Encounter    No data found in the last 1 encounters.       Stop Bang Score      Flowsheet Row Most Recent Value   Do you snore loudly? 1   Do you often feel tired or fatigued after your sleep? 0   Has anyone ever observed you stop breathing in your sleep? 0   Do you have or are you being treated for high blood pressure? 0   Recent BMI (Calculated) 38   Is BMI greater than 35 kg/m2? 1=Yes   Age older than 50 years old? 1=Yes   Is your neck circumference greater than 17 inches (Male) or 16 inches (Female)? 0   Gender - Male 0=No   STOP-BANG Total Score 3            Assessment and Plan:   52 y/o female with a history of AUB who is scheduled for a hysteroscopy and C&C on 5/15/24 with Dr. Soto. Presents today to [facility] for perioperative risk stratification.     Genitourinary  The patient has diagnoses or significant findings on chart review or clinical presentation and evaluation significant for AUB. Under the care of GYN with surgery planned as noted above.     Anesthesia:  The patient  denies problems with anesthesia in the past such as PONV, prolonged sedation, awareness, dental damage, aspiration, cardiac arrest, difficult intubation, or unexpected hospital admissions. No previous surgeries.     Neuro:   The patient has no neurological diagnoses or significant findings on chart review, clinical presentation, and evaluation. No grossly apparent neurological perioperative risk.  Handouts for preoperative brain exercises given to patient.    HEENT/Airway  No diagnoses, significant findings on chart review, clinical presentation, or evaluation.    Cardiovascular  RCRI  The patient meets 0-1 RCRI criteria and therefore has a less than 1% risk of major adverse cardiac complications.  METS  The patient's functional capacity capacity is greater than 4 METS.  EKG  The patient has no EKG or echocardiographic changes concerning for myocardial ischemia.   EKG on 5/8/24:  Normal Sinus Rhythm  Vent rate: 63 bmp  Heart Failure  The patient has no known history of heart failure.  Additionally, the patient reports no symptoms of heart failure and demonstrates no signs of heart failure.  Hypertension Evaluation  The patient has no known history of hypertension and has a normal blood pressure today.  Heart Rhythm Evaluation  The patient has no history of arrhythmias.  Heart Valve Evaluation  The patient has no known history of valvular heart disease. The patient has no symptoms or physical exam findings to suggest valvular heart disease.  Cardiology Evaluation  The patient is not followed by cardiology.  The patient is not followed by cardiology.    YONATHAN score which indicates a 0.0% risk of intraoperative or 30-day postoperative.    Pulmonary   No significant findings on chart review or clinical presentation and evaluation.    The patient has a stop bang score of 6, which places patient at intermediate risk for having BALDO.    ARISCAT 11, low, 1.6% risk of in-hospital postoperative pulmonary complications  PRODIGY  5, low risk of respiratory depression episode. Patient given PI sheet for preoperative deep breathing exercises.    Hematology  No diagnoses or significant findings on chart review or clinical presentation and evaluation.  No diagnoses or significant findings on chart review or clinical presentation and evaluation.  Antiplatelet management   The patient is not currently receiving antiplatelet therapy.    Anticoagulation management  The patient is not currently receiving anticoagulation therapy.  The patient is not currently receiving anticoagulation therapy.    Caprini score 6, intermediate risk of perioperative VTE.     Patient instructed to ambulate as soon as possible postoperatively to decrease thromboembolic risk. Initiate mechanical DVT prophylaxis as soon as possible and initiate chemical prophylaxis when deemed safe from a bleeding standpoint post surgery.     Transfusion Evaluation  No history of transfusion.   Agreeable to transfusion if needed.     Gastrointestinal  No diagnoses or significant findings on chart review or clinical presentation and evaluation.    Renal  The patient has no known history of chronic kidney disease.    Musculoskeletal  No diagnoses or significant findings on chart review or clinical presentation and evaluation.    Endocrine  Diabetes Evaluation  The patient has no history of diabetes mellitus.  Thyroid Disease Evaluation  The patient has no history of thyroid disease.    ID  No diagnoses or significant findings on chart review or clinical presentation and evaluation.    -Preoperative medication instructions were provided and reviewed with the patient.  Any additional testing or evaluation was explained to the patient.  NPO Instructions were discussed, and the patient's questions were answered prior to conclusion of this encounter.    -CBC, BMP collected. Results pending.

## 2024-05-08 NOTE — CPM/PAT H&P
CPM/PAT Evaluation       Name: Mare Hayes (Mare Hayes)  /Age: 1970/53 y.o.     In-Person       Chief Complaint: Abnormal Uterine Bleeding    HPI 54 y/o female with a history of AUB who is scheduled for a hysteroscopy and C&C on 5/15/24 with Dr. Soto. Presents today to [facility] for perioperative risk stratification.  She reports intermittent uterine bleeding after no menses x 1 year. States had some recent spotting in the past week. No pain or discomfort. Remote history of MONO with heavy menses.     No broken, loose, chipped, cracked teeth. Has lower partials but states will not wear to surgery.   No hearing aids.  No glasses except for driving.   No issues with anesthesia or difficult intubation in the past.     Past Medical History:   Diagnosis Date    Atypical squamous cells of undetermined significance on cytologic smear of cervix (ASC-US)     Pap smear abnormality of cervix with ASCUS favoring benign    Atypical squamous cells of undetermined significance on cytologic smear of cervix (ASC-US)     Pap smear abnormality of cervix with ASCUS favoring benign    Encounter for full-term uncomplicated delivery ([facility])      (spontaneous vaginal delivery)    Encounter for gynecological examination (general) (routine) without abnormal findings 2017    Encounter for gynecological examination without abnormal finding    Flushing 2020    Hot flashes    Hot flashes 2023    Iron deficiency 2019    Low iron    Meniere's disease, unspecified ear     Meniere's disease    Meniere's disease, unspecified ear 10/25/2013    Meniere's disease    Otalgia, bilateral 2022    Otalgia of both ears    Other abnormal auditory perceptions, bilateral 2022    Other abnormal auditory perceptions, bilateral    Personal history of other complications of pregnancy, childbirth and the puerperium     History of miscarriage    Personal history of other diseases of the female genital tract 2018     History of irregular menstrual cycles    Personal history of other diseases of the nervous system and sense organs     History of restless legs syndrome    Personal history of other diseases of the nervous system and sense organs 02/07/2022    History of ear pain    Personal history of other medical treatment 11/01/2017    History of screening mammography    Sleep apnea, unspecified     Mild sleep apnea    Unspecified otitis externa, right ear 11/03/2021    Right otitis externa    Varicella without complication     Varicella without complication       Past Surgical History:   Procedure Laterality Date    COLONOSCOPY      OTHER SURGICAL HISTORY  10/28/2019    Cervical biopsy procedure colposcopic       Patient  reports being sexually active and has had partner(s) who are male. She reports using the following method of birth control/protection: None.    Family History   Problem Relation Name Age of Onset    Hypertension Mother      Stroke Other Grandfather        Allergies   Allergen Reactions    Clindamycin Swelling    Penicillins Hives    Sulfa (Sulfonamide Antibiotics) Hives       Prior to Admission medications    Medication Sig Start Date End Date Taking? Authorizing Provider   calcium acetate (Phoslo) 667 mg capsule Take 2 capsules (1,334 mg) by mouth 3 times a day.    Historical Provider, MD   calcium carbonate-vitamin D3 500 mg-5 mcg (200 unit) tablet Take 1 tablet by mouth once daily.    Historical Provider, MD   multivitamin tablet Take 1 tablet by mouth once daily.    Historical Provider, MD   zinc sulfate (Zincate) 220 (50 Zn) MG capsule Take 1 capsule (50 mg of elemental zinc) by mouth once daily.    Historical Provider, MD MOSES ROSALES   Constitutional: Denies fever, chills, fatigue, weight loss/gain  HEENT: Denies ear ache, sore throat, nasal drainage  Eyes: Denies blurred or double vision  Respiratory: Denies cough, shortness or breath, wheezing  Cardiovascular: Denies chest pain, palpitations,  shortness of breath with exertion, edema  GI: Denies abdominal pain, nausea, vomiting, diarrhea, constipation, bloody stools  : Denies urinary burning, urgency, frequency, hematuria  Musculoskeletal: Denies back, neck, or joint pain; joint redness, swelling, or tenderness  Endocrine: Denies cold/heat intolerance, excessive thirst, excessive hunger  Neuro: Denies dizziness, lightheadedness, seizures, headaches  Psychiatric: Denies anxiety, depression, self-harm  Skin: Denies wounds, rashes, lesions  Hematologic: Denies easy bruising, easy bleeding, clotting disorder       Physical Exam  Constitutional:       Appearance: Normal appearance.   HENT:      Head: Normocephalic and atraumatic.      Nose: Nose normal.      Mouth/Throat:      Mouth: Mucous membranes are moist.      Pharynx: Oropharynx is clear.   Eyes:      Extraocular Movements: Extraocular movements intact.      Conjunctiva/sclera: Conjunctivae normal.   Cardiovascular:      Rate and Rhythm: Normal rate and regular rhythm.      Pulses: Normal pulses.      Heart sounds: Normal heart sounds.   Pulmonary:      Effort: Pulmonary effort is normal.      Breath sounds: Normal breath sounds.   Abdominal:      General: Bowel sounds are normal.      Palpations: Abdomen is soft.   Musculoskeletal:         General: Normal range of motion.      Cervical back: Normal range of motion and neck supple.   Skin:     General: Skin is warm and dry.   Neurological:      General: No focal deficit present.      Mental Status: She is alert and oriented to person, place, and time. Mental status is at baseline.   Psychiatric:         Mood and Affect: Mood normal.         Behavior: Behavior normal.         Thought Content: Thought content normal.          PAT AIRWAY:   Airway:     Mallampati::  II    Neck ROM::  Full      Visit Vitals  BP (!) 127/92   Pulse 72   Temp 36.2 °C (97.2 °F) (Temporal)   Resp 18       DASI Risk Score      Flowsheet Row Most Recent Value   DASI SCORE 44.7    METS Score (Will be calculated only when all the questions are answered) 8.2          Caprini DVT Assessment      Flowsheet Row Most Recent Value   DVT Score 6   Current Status Major surgery planned, including arthroscopic and laproscopic (1-2 hours)   Age 40-59 years   BMI 31-40 (Obesity)          Modified Frailty Index    No data to display       CHADS2 Stroke Risk  Current as of 9 minutes ago        N/A 3 to 100%: High Risk   2 to < 3%: Medium Risk   0 to < 2%: Low Risk     Last Change: N/A          This score determines the patient's risk of having a stroke if the patient has atrial fibrillation.        This score is not applicable to this patient. Components are not calculated.          Revised Cardiac Risk Index      Flowsheet Row Most Recent Value   Revised Cardiac Risk Calculator 0          Apfel Simplified Score      Flowsheet Row Most Recent Value   Apfel Simplified Score Calculator 2          Risk Analysis Index Results This Encounter    No data found in the last 1 encounters.       Stop Bang Score      Flowsheet Row Most Recent Value   Do you snore loudly? 1   Do you often feel tired or fatigued after your sleep? 0   Has anyone ever observed you stop breathing in your sleep? 0   Do you have or are you being treated for high blood pressure? 0   Recent BMI (Calculated) 38   Is BMI greater than 35 kg/m2? 1=Yes   Age older than 50 years old? 1=Yes   Is your neck circumference greater than 17 inches (Male) or 16 inches (Female)? 0   Gender - Male 0=No   STOP-BANG Total Score 3            Assessment and Plan:   54 y/o female with a history of AUB who is scheduled for a hysteroscopy and C&C on 5/15/24 with Dr. Soto. Presents today to [facility] for perioperative risk stratification.     Genitourinary  The patient has diagnoses or significant findings on chart review or clinical presentation and evaluation significant for AUB. Under the care of GYN with surgery planned as noted above.     Anesthesia:  The patient  denies problems with anesthesia in the past such as PONV, prolonged sedation, awareness, dental damage, aspiration, cardiac arrest, difficult intubation, or unexpected hospital admissions. No previous surgeries.     Neuro:   The patient has no neurological diagnoses or significant findings on chart review, clinical presentation, and evaluation. No grossly apparent neurological perioperative risk.  Handouts for preoperative brain exercises given to patient.    HEENT/Airway  No diagnoses, significant findings on chart review, clinical presentation, or evaluation.    Cardiovascular  RCRI  The patient meets 0-1 RCRI criteria and therefore has a less than 1% risk of major adverse cardiac complications.  METS  The patient's functional capacity capacity is greater than 4 METS.  EKG  The patient has no EKG or echocardiographic changes concerning for myocardial ischemia.   EKG on 5/8/24:  Normal Sinus Rhythm  Vent rate: 63 bmp  Heart Failure  The patient has no known history of heart failure.  Additionally, the patient reports no symptoms of heart failure and demonstrates no signs of heart failure.  Hypertension Evaluation  The patient has no known history of hypertension and has a normal blood pressure today.  Heart Rhythm Evaluation  The patient has no history of arrhythmias.  Heart Valve Evaluation  The patient has no known history of valvular heart disease. The patient has no symptoms or physical exam findings to suggest valvular heart disease.  Cardiology Evaluation  The patient is not followed by cardiology.  The patient is not followed by cardiology.    YONATHAN score which indicates a 0.0% risk of intraoperative or 30-day postoperative.    Pulmonary   No significant findings on chart review or clinical presentation and evaluation.    The patient has a stop bang score of 6, which places patient at intermediate risk for having BALDO.    ARISCAT 11, low, 1.6% risk of in-hospital postoperative pulmonary complications  PRODIGY  5, low risk of respiratory depression episode. Patient given PI sheet for preoperative deep breathing exercises.    Hematology  No diagnoses or significant findings on chart review or clinical presentation and evaluation.  No diagnoses or significant findings on chart review or clinical presentation and evaluation.  Antiplatelet management   The patient is not currently receiving antiplatelet therapy.    Anticoagulation management  The patient is not currently receiving anticoagulation therapy.  The patient is not currently receiving anticoagulation therapy.    Caprini score 6, intermediate risk of perioperative VTE.     Patient instructed to ambulate as soon as possible postoperatively to decrease thromboembolic risk. Initiate mechanical DVT prophylaxis as soon as possible and initiate chemical prophylaxis when deemed safe from a bleeding standpoint post surgery.     Transfusion Evaluation  No history of transfusion.   Agreeable to transfusion if needed.     Gastrointestinal  No diagnoses or significant findings on chart review or clinical presentation and evaluation.    Renal  The patient has no known history of chronic kidney disease.    Musculoskeletal  No diagnoses or significant findings on chart review or clinical presentation and evaluation.    Endocrine  Diabetes Evaluation  The patient has no history of diabetes mellitus.  Thyroid Disease Evaluation  The patient has no history of thyroid disease.    ID  No diagnoses or significant findings on chart review or clinical presentation and evaluation.    -Preoperative medication instructions were provided and reviewed with the patient.  Any additional testing or evaluation was explained to the patient.  NPO Instructions were discussed, and the patient's questions were answered prior to conclusion of this encounter.    -CBC, BMP collected. Results pending.

## 2024-05-08 NOTE — PREPROCEDURE INSTRUCTIONS
Medication List            Accurate as of May 8, 2024  8:28 AM. Always use your most recent med list.                calcium acetate 667 mg capsule  Commonly known as: Phoslo  Medication Adjustments for Surgery: Stop 7 days before surgery     calcium carbonate-vitamin D3 500 mg-5 mcg (200 unit) tablet  Medication Adjustments for Surgery: Stop 7 days before surgery     multivitamin tablet  Medication Adjustments for Surgery: Stop 7 days before surgery     zinc sulfate 220 (50 Zn) MG capsule  Commonly known as: Zincate  Medication Adjustments for Surgery: Stop 7 days before surgery            SURGERY PRE-OPERATIVE INSTRUCTIONS    *You will receive a phone call the day before your procedure  after 2pm, (or the Friday before your surgery if scheduled on a Monday.) Generally the hospital will be calling you with this information after that time.    *You are not to eat after midnight the night before the surgery. You may have 8oz of a clear liquid up until 2 hours prior to arriving to the hospital. The exception is with medications you were instructed to take day of surgery.    *You may take tylenol for pain/discomfort as needed.     *Stop taking all aspirin products, ibuprofen (motrin/advil), naproxen (aleve/naprosyn) for one week prior to surgery.    *Stop taking all vitamins and supplements one week prior to surgery.     *You should not have alcoholic beverages for 24 hours before surgery.     *You should not smoke 24 hours prior to surgery.     *To help prevent surgical infections bathe/shower with Dial soap the evening before surgery.    *You can wear deodorant but no lotion, powder, or perfume/cologne. You should remove all make-up and nail polish at home.    *If you wear glasses, please bring a case for the glasses with you.    *You will be asked to remove dentures and contacts.     *Please leave all valuables at home.    *You should wear loose, comfortable clothing that will accommodate bandages and/or  casts.    *You should notify your doctor of any change in your condition (fever, cold, rash, etc). Surgery may need to be re-scheduled until a time you are in better health.    *A responsible adult is required to accompany you to and from the hospital if you are receiving anesthesia or a sedative. Patients are not permitted to drive for 24 hours after anesthesia.     *You can use the [employer] parking if you wish.     *If you have any further questions please call [facility] [phone].                   NPO Instructions:    Do not eat any food after midnight the night before your surgery/procedure.    Additional Instructions:     Review your medication instructions, stop indicated medications

## 2024-05-13 ENCOUNTER — ANESTHESIA EVENT (OUTPATIENT)
Dept: OPERATING ROOM | Facility: HOSPITAL | Age: 54
End: 2024-05-13
Payer: COMMERCIAL

## 2024-05-14 RX ORDER — SODIUM CHLORIDE, SODIUM LACTATE, POTASSIUM CHLORIDE, CALCIUM CHLORIDE 600; 310; 30; 20 MG/100ML; MG/100ML; MG/100ML; MG/100ML
100 INJECTION, SOLUTION INTRAVENOUS CONTINUOUS
Status: CANCELLED | OUTPATIENT
Start: 2024-05-14

## 2024-05-14 RX ORDER — ONDANSETRON HYDROCHLORIDE 2 MG/ML
4 INJECTION, SOLUTION INTRAVENOUS ONCE AS NEEDED
Status: CANCELLED | OUTPATIENT
Start: 2024-05-14

## 2024-05-14 RX ORDER — DROPERIDOL 2.5 MG/ML
0.62 INJECTION, SOLUTION INTRAMUSCULAR; INTRAVENOUS ONCE AS NEEDED
Status: CANCELLED | OUTPATIENT
Start: 2024-05-14

## 2024-05-14 RX ORDER — OXYCODONE HYDROCHLORIDE 5 MG/1
5 TABLET ORAL EVERY 4 HOURS PRN
Status: CANCELLED | OUTPATIENT
Start: 2024-05-14

## 2024-05-15 ENCOUNTER — HOSPITAL ENCOUNTER (OUTPATIENT)
Facility: HOSPITAL | Age: 54
Setting detail: OUTPATIENT SURGERY
Discharge: HOME | End: 2024-05-15
Attending: OBSTETRICS & GYNECOLOGY | Admitting: OBSTETRICS & GYNECOLOGY
Payer: COMMERCIAL

## 2024-05-15 ENCOUNTER — ANESTHESIA (OUTPATIENT)
Dept: OPERATING ROOM | Facility: HOSPITAL | Age: 54
End: 2024-05-15
Payer: COMMERCIAL

## 2024-05-15 VITALS
HEART RATE: 61 BPM | DIASTOLIC BLOOD PRESSURE: 80 MMHG | TEMPERATURE: 97.7 F | OXYGEN SATURATION: 94 % | WEIGHT: 198.41 LBS | SYSTOLIC BLOOD PRESSURE: 113 MMHG | RESPIRATION RATE: 14 BRPM | HEIGHT: 61 IN | BODY MASS INDEX: 37.46 KG/M2

## 2024-05-15 DIAGNOSIS — N93.9 ABNORMAL UTERINE BLEEDING (AUB): ICD-10-CM

## 2024-05-15 LAB — PREGNANCY TEST URINE, POC: NEGATIVE

## 2024-05-15 PROCEDURE — 3600000003 HC OR TIME - INITIAL BASE CHARGE - PROCEDURE LEVEL THREE: Performed by: OBSTETRICS & GYNECOLOGY

## 2024-05-15 PROCEDURE — 7100000001 HC RECOVERY ROOM TIME - INITIAL BASE CHARGE: Performed by: OBSTETRICS & GYNECOLOGY

## 2024-05-15 PROCEDURE — 3700000001 HC GENERAL ANESTHESIA TIME - INITIAL BASE CHARGE: Performed by: OBSTETRICS & GYNECOLOGY

## 2024-05-15 PROCEDURE — 2500000005 HC RX 250 GENERAL PHARMACY W/O HCPCS: Performed by: NURSE ANESTHETIST, CERTIFIED REGISTERED

## 2024-05-15 PROCEDURE — A58558 PR HYSTEROSCOPY,W/ENDO BX: Performed by: NURSE ANESTHETIST, CERTIFIED REGISTERED

## 2024-05-15 PROCEDURE — 7100000009 HC PHASE TWO TIME - INITIAL BASE CHARGE: Performed by: OBSTETRICS & GYNECOLOGY

## 2024-05-15 PROCEDURE — 2500000004 HC RX 250 GENERAL PHARMACY W/ HCPCS (ALT 636 FOR OP/ED): Performed by: NURSE ANESTHETIST, CERTIFIED REGISTERED

## 2024-05-15 PROCEDURE — 2720000007 HC OR 272 NO HCPCS: Performed by: OBSTETRICS & GYNECOLOGY

## 2024-05-15 PROCEDURE — 58558 HYSTEROSCOPY BIOPSY: CPT | Performed by: OBSTETRICS & GYNECOLOGY

## 2024-05-15 PROCEDURE — 88305 TISSUE EXAM BY PATHOLOGIST: CPT | Performed by: PATHOLOGY

## 2024-05-15 PROCEDURE — 81025 URINE PREGNANCY TEST: CPT | Performed by: ANESTHESIOLOGY

## 2024-05-15 PROCEDURE — 3600000008 HC OR TIME - EACH INCREMENTAL 1 MINUTE - PROCEDURE LEVEL THREE: Performed by: OBSTETRICS & GYNECOLOGY

## 2024-05-15 PROCEDURE — 3700000002 HC GENERAL ANESTHESIA TIME - EACH INCREMENTAL 1 MINUTE: Performed by: OBSTETRICS & GYNECOLOGY

## 2024-05-15 PROCEDURE — 2500000004 HC RX 250 GENERAL PHARMACY W/ HCPCS (ALT 636 FOR OP/ED): Performed by: ANESTHESIOLOGY

## 2024-05-15 PROCEDURE — 88305 TISSUE EXAM BY PATHOLOGIST: CPT | Mod: TC,GEALAB | Performed by: OBSTETRICS & GYNECOLOGY

## 2024-05-15 PROCEDURE — 7100000002 HC RECOVERY ROOM TIME - EACH INCREMENTAL 1 MINUTE: Performed by: OBSTETRICS & GYNECOLOGY

## 2024-05-15 PROCEDURE — 7100000010 HC PHASE TWO TIME - EACH INCREMENTAL 1 MINUTE: Performed by: OBSTETRICS & GYNECOLOGY

## 2024-05-15 PROCEDURE — A58558 PR HYSTEROSCOPY,W/ENDO BX: Performed by: ANESTHESIOLOGY

## 2024-05-15 RX ORDER — LIDOCAINE HYDROCHLORIDE 10 MG/ML
INJECTION, SOLUTION EPIDURAL; INFILTRATION; INTRACAUDAL; PERINEURAL AS NEEDED
Status: DISCONTINUED | OUTPATIENT
Start: 2024-05-15 | End: 2024-05-15

## 2024-05-15 RX ORDER — PROPOFOL 10 MG/ML
INJECTION, EMULSION INTRAVENOUS AS NEEDED
Status: DISCONTINUED | OUTPATIENT
Start: 2024-05-15 | End: 2024-05-15

## 2024-05-15 RX ORDER — SODIUM CHLORIDE, SODIUM LACTATE, POTASSIUM CHLORIDE, CALCIUM CHLORIDE 600; 310; 30; 20 MG/100ML; MG/100ML; MG/100ML; MG/100ML
100 INJECTION, SOLUTION INTRAVENOUS CONTINUOUS
Status: DISCONTINUED | OUTPATIENT
Start: 2024-05-15 | End: 2024-05-15 | Stop reason: HOSPADM

## 2024-05-15 RX ORDER — ONDANSETRON HYDROCHLORIDE 2 MG/ML
INJECTION, SOLUTION INTRAVENOUS AS NEEDED
Status: DISCONTINUED | OUTPATIENT
Start: 2024-05-15 | End: 2024-05-15

## 2024-05-15 RX ORDER — KETOROLAC TROMETHAMINE 30 MG/ML
INJECTION, SOLUTION INTRAMUSCULAR; INTRAVENOUS AS NEEDED
Status: DISCONTINUED | OUTPATIENT
Start: 2024-05-15 | End: 2024-05-15

## 2024-05-15 RX ORDER — MIDAZOLAM HYDROCHLORIDE 1 MG/ML
INJECTION INTRAMUSCULAR; INTRAVENOUS AS NEEDED
Status: DISCONTINUED | OUTPATIENT
Start: 2024-05-15 | End: 2024-05-15

## 2024-05-15 RX ORDER — FENTANYL CITRATE 50 UG/ML
INJECTION, SOLUTION INTRAMUSCULAR; INTRAVENOUS AS NEEDED
Status: DISCONTINUED | OUTPATIENT
Start: 2024-05-15 | End: 2024-05-15

## 2024-05-15 RX ADMIN — LIDOCAINE HYDROCHLORIDE 50 MG: 10 INJECTION, SOLUTION EPIDURAL; INFILTRATION; INTRACAUDAL; PERINEURAL at 09:19

## 2024-05-15 RX ADMIN — DEXAMETHASONE SODIUM PHOSPHATE 4 MG: 4 INJECTION INTRA-ARTICULAR; INTRALESIONAL; INTRAMUSCULAR; INTRAVENOUS; SOFT TISSUE at 09:24

## 2024-05-15 RX ADMIN — SODIUM CHLORIDE, POTASSIUM CHLORIDE, SODIUM LACTATE AND CALCIUM CHLORIDE 100 ML/HR: 600; 310; 30; 20 INJECTION, SOLUTION INTRAVENOUS at 08:11

## 2024-05-15 RX ADMIN — PROPOFOL 200 MG: 10 INJECTION, EMULSION INTRAVENOUS at 09:19

## 2024-05-15 RX ADMIN — MIDAZOLAM HYDROCHLORIDE 2 MG: 1 INJECTION, SOLUTION INTRAMUSCULAR; INTRAVENOUS at 09:12

## 2024-05-15 RX ADMIN — ONDANSETRON 4 MG: 2 INJECTION, SOLUTION INTRAMUSCULAR; INTRAVENOUS at 09:12

## 2024-05-15 RX ADMIN — KETOROLAC TROMETHAMINE 30 MG: 30 INJECTION, SOLUTION INTRAMUSCULAR; INTRAVENOUS at 09:12

## 2024-05-15 RX ADMIN — FENTANYL CITRATE 100 MCG: 50 INJECTION, SOLUTION INTRAMUSCULAR; INTRAVENOUS at 09:19

## 2024-05-15 ASSESSMENT — COLUMBIA-SUICIDE SEVERITY RATING SCALE - C-SSRS
2. HAVE YOU ACTUALLY HAD ANY THOUGHTS OF KILLING YOURSELF?: NO
1. IN THE PAST MONTH, HAVE YOU WISHED YOU WERE DEAD OR WISHED YOU COULD GO TO SLEEP AND NOT WAKE UP?: NO
6. HAVE YOU EVER DONE ANYTHING, STARTED TO DO ANYTHING, OR PREPARED TO DO ANYTHING TO END YOUR LIFE?: NO

## 2024-05-15 ASSESSMENT — PAIN SCALES - GENERAL: PAIN_LEVEL: 1

## 2024-05-15 NOTE — ANESTHESIA PREPROCEDURE EVALUATION
Patient: Mare Hayes    Procedure Information       Date/Time: 05/15/24 0900    Procedure: HYSTEROSCOPY, D&C    Location: [facility] / [facility]    Surgeons: Nickolas Soto MD            Relevant Problems   Pulmonary   (+) Obstructive sleep apnea      Endocrine   (+) Obesity      ID   (+) HPV in female      GYN   (+) Abnormal uterine bleeding (AUB)       Clinical information reviewed:   Tobacco  Allergies  Meds  Problems  Med Hx  Surg Hx  OB Status    Fam Hx  Soc Hx        NPO Detail:  NPO/Void Status  Date of Last Liquid: 05/14/24  Date of Last Solid: 05/14/24         Physical Exam    Airway  Mallampati: II  TM distance: >3 FB  Neck ROM: full     Cardiovascular - normal exam  Rhythm: regular  Rate: normal     Dental - normal exam     Pulmonary - normal exam  Breath sounds clear to auscultation     Abdominal - normal exam             Anesthesia Plan    History of general anesthesia?: yes  History of complications of general anesthesia?: no    ASA 2     general     intravenous induction   Anesthetic plan and risks discussed with patient.    Plan discussed with CRNA.

## 2024-05-15 NOTE — ANESTHESIA PROCEDURE NOTES
Airway  Date/Time: 5/15/2024 9:19 AM  Urgency: elective    Airway not difficult    Staffing  Performed: CRNA   Authorized by: NIGEL Parra    Performed by: NIGEL Parra  Patient location during procedure: OR    Indications and Patient Condition  Indications for airway management: anesthesia  Spontaneous Ventilation: absent  Sedation level: deep  Patient position: sniffing  MILS maintained throughout  Mask difficulty assessment: 1 - vent by mask  Planned trial extubation    Final Airway Details  Final airway type: supraglottic airway      Successful airway: Supraglottic airway: IGel.  Size 4     Number of attempts at approach: 1  Number of other approaches attempted: 0           Discharge instructions provided by [facility].

## 2024-05-15 NOTE — ANESTHESIA POSTPROCEDURE EVALUATION
Patient: Mare Hayes    Procedure Summary       Date: 05/15/24 Room / Location: [facility] / [facility]    Anesthesia Start: 0903 Anesthesia Stop: 0949    Procedure: HYSTEROSCOPY, D&C Diagnosis:       Abnormal uterine bleeding (AUB)      (Abnormal uterine bleeding (AUB) [N93.9])    Surgeons: Nickolas Soto MD Responsible Provider: Abad Block MD    Anesthesia Type: general ASA Status: 2            Anesthesia Type: general    Vitals Value Taken Time   /77 05/15/24 1001   Temp 36.5 °C (97.7 °F) 05/15/24 0946   Pulse 69 05/15/24 1016   Resp 13 05/15/24 1016   SpO2 97 % 05/15/24 1016       Anesthesia Post Evaluation    Patient location during evaluation: PACU  Patient participation: complete - patient participated  Level of consciousness: awake  Pain score: 1  Pain management: adequate  Airway patency: patent  Cardiovascular status: acceptable  Respiratory status: acceptable  Hydration status: acceptable  Postoperative Nausea and Vomiting: none        No notable events documented.

## 2024-05-15 NOTE — OP NOTE
HYSTEROSCOPY, D&C Operative Note     Date: 5/15/2024  OR Location: [facility] OR    Name: Mare Hayes, : 1970, Age: 53 y.o., MRN: [id number], Sex: female    Diagnosis  Pre-op Diagnosis     * Abnormal uterine bleeding (AUB) [N93.9] Post-op Diagnosis     * Abnormal uterine bleeding (AUB) [N93.9]     Procedures  HYSTEROSCOPY, D&C  03107 - [place] HYSTEROSCOPY REMOVAL LEIOMYOMATA    [place] HYSTEROSCOPY BX ENDOMETRIUM&/POLYPC W/WO D&C [56387]  Surgeons      * Nickolas VILLARREAL Soto - Primary    Resident/Fellow/Other Assistant:  Surgeons and Role:  * No surgeons found with a matching role *    Procedure Summary  Anesthesia: Consult  ASA: II  Anesthesia Staff: CRNA: MINA Parra-CRNA  Estimated Blood Loss: 2mL  Intra-op Medications: Administrations occurring from 0900 to 1000 on 05/15/24:  * No intraprocedure medications in log *           Anesthesia Record               Intraprocedure I/O Totals       None           Specimen: No specimens collected     Staff:   Circulator: Vivek Cornelius RN  Scrub Person: Sofi Glover RN  RNFA: Niraj Martínez RN         Drains and/or Catheters: * None in log *      Findings: normal appearing endometrium with no discrete lesions. Possible endocervical lesion.     Indications: Mare Hayes is an 53 y.o. female who is having surgery for Abnormal uterine bleeding (AUB) [N93.9].     The patient was seen in the preoperative area. The risks, benefits, complications, treatment options, non-operative alternatives, expected recovery and outcomes were discussed with the patient. The possibilities of reaction to medication, pulmonary aspiration, injury to surrounding structures, bleeding, recurrent infection, the need for additional procedures, failure to diagnose a condition, and creating a complication requiring transfusion or operation were discussed with the patient. The patient concurred with the proposed plan, giving informed consent.  The site of surgery was properly noted/marked if  necessary per policy. The patient has been actively warmed in preoperative area. Preoperative antibiotics are not indicated. Venous thrombosis prophylaxis have been ordered including bilateral sequential compression devices    Procedure Details: The patient was taken to the operating room where anesthesia was found to be adequate. She was placed in the dorsal supine position with a leftward tilt. SCDs were placed. Pham catheter was placed. Vaginal prep was performed.   A speculum was inserted into the vagina and the anterior lip was grasped with a single tooth tenaculum. Her cervix was serially dilated to allow passage of the hysteroscope. Findings noted as above. Gentle endometrial curettage was then performed with a sharp curette until uterine cry was noted in all four quadrants. Specimen was sent to pathology. All instruments were removed from the vagina. The patient tolerated the procedure well. All counts were correct per nursing staff. The patient was returned to the recovery room in stable condition.   Complications:  None; patient tolerated the procedure well.    Disposition: PACU - hemodynamically stable.  Condition: stable         Additional Details: normal saline hysteroscope fluid, deficit 180mL     Attending Attestation:     Nickolas Soto  Phone Number: [phone]

## 2024-05-15 NOTE — DISCHARGE INSTRUCTIONS
[facility]  [facility]  [address] [place]  [address]  Tel: [phone]  ([place]) or [phone] ([place])  Home Going Instructions after Hysteroscopy:    Activity:   You should rest on the day of surgery  You may not return to work until cleared by your surgeon. If you have [facility] paperwork to be signed, please fill out your portion and drop off at either the [place] or [place] offices to be reviewed and signed.   You may have light bleeding or spotting for a few weeks.   Use only sanitary pads for bleeding, do not use tampons  Please abstain from intercourse until you are cleared by your surgeon  Please do not fully submerge in water (pool/hot tub/bath tub) even in your own home. Shower is fine.  Do not drive for 24 hours after anesthesia, while taking narcotic pain management, and while requiring short interval Tylenol/Ibuprofen for pain   Do not consume alcohol for 24 hours after anesthesia or while using any narcotic pain medication    Anesthesia:  Drink small amounts of liquids initially and then slowly increase your intake of food. Drinking fluids will keep your bowels regular.   Avoid foods that are sweet, spicy or hard to digest today.  If you feel nauseated, rest your stomach for one hour, and then try drinking clear liquids again.  You may take a stool softener or miralax/milk of magnesia to help with constipation that may occur after anesthesia.  Please make sure a responsible adult is with you for at least 24 hours after surgery and do not drive or make important decisions during this time. Anesthesia may affect your judgment, coordination, and reaction time.    Pain:  If you experience any post-op pain, pain can be managed by taking Ibuprofen and/or Tylenol.     You may additionally use heat or cool packs to alleviate discomfort    Follow up:  Follow up with your surgeon in the office 2 weeks after your procedure for an incision check    When to  call your provider:  Vaginal bleeding that is more than a normal period that doesn't taper down.  Bleeding through 1 sanitary pad an hour.  Any clots the size of an egg or bigger.  Fever of 100.4 or higher with chills.  Unusual or foul smelling discharge.  Worsening pelvic or abdominal pain.  Always call the office to be connected with your surgeon or the physician on call for the practice. If your call is during office hours (Monday through Friday 9:00 AM to 4:00 PM) press 1 to be connected to the . If you are calling after hours you will be transferred to our answering service and they will connect you with the physician on call.    @Nickolas Soto MD@

## 2024-05-24 LAB
LABORATORY COMMENT REPORT: NORMAL
PATH REPORT.COMMENTS IMP SPEC: NORMAL
PATH REPORT.FINAL DX SPEC: NORMAL
PATH REPORT.GROSS SPEC: NORMAL
PATH REPORT.RELEVANT HX SPEC: NORMAL
PATH REPORT.TOTAL CANCER: NORMAL

## 2024-05-29 ENCOUNTER — TELEPHONE (OUTPATIENT)
Dept: OBSTETRICS AND GYNECOLOGY | Facility: CLINIC | Age: 54
End: 2024-05-29
Payer: COMMERCIAL

## 2024-05-30 NOTE — PROGRESS NOTES
HPI  Mare Hayes is a 53 y.o.  for followup abnormal pathology    Diagnosis initially documented as AUB given HP. Pt update history, this is more consistent with PMB as she reports full year without menses. This represents PMB more accurately in give her HPI.     S/p hysteroscopy and D&C on 5/15/24.  Spotting and crampy lower pelvic pain both resolve a few days after procedure.   Fatigue for past two weeks.  Right sided hip pain with chiropractic care, they noted everything is inflamed. She has had to go for frequent alignments. She does this regularly but has had to go more than usual.  Pain is alleviated with rest, overall improving.  She thinks this may have happened on the operating table.    D&C Pathology: 5/15/24  Focal endometrial hyperplasia (without atypia) in a background of endometrial polyp and proliferative pattern endometrium with tubal and squamous metaplasia.     EMB Pathology: 2024  Benign endometrial surface epithelium, too scant for further evaluation.      TVUS 2024:  Uterus: Measuring 8.8 x 5.8 x 5.2 cm. 1.2 cm anterior lower uterine body fibroid. 1.3 cm posterior fibroid.  EMS: Shadowing could represent adenomyosis  Adnexa negative bilaterally     Last seen by me on 10/28/2019 for Colposcopy:  PAP 19 ASCUS/ +HPV (16)  No h/o +HPV pap. Previous paps were ASCUS or negative.   Feeling well today physically. Having a difficult time with this new HPV diagnosis due to social situation preceding this.    AUB hx: note by Conchita Martines on 2024    She had went just about 1 year with no period.     Started bleeding on . Has been having daily bleeding since.    Light flow, but bright red. Only needing panty liner.     Reports pelvic pressure and urinary urgency.     Denies abnormal vaginal discharge, itching, or odor.     She is sexually active with . Denies dyspareunia.    She had recent CBC, TSH with PCP which were normal.   Pap in 2023 ASCUS/+HRHPV.  Colposcopy on 12/07/2023 with Dr. Lopes. ECC negative. H/O abnormal pap ASCUS, -   +HPV-16 with negative colposcopy in 10/2019. Last pap: 10/2020 and was negative and negative HPV.      PMH: None    No family h/o GYN related cancers or disorders.     ROS notable for fatigue, right hip pain radiating to flank; crampy lower pelvic pain and vaginal bleeding have resolved.  10 point ROS negative except as listed above.     Physical exam  LMP 12/11/2022 Comment: ucg negative     Physical Exam  Constitutional:       Appearance: Normal appearance.   HENT:      Head: Normocephalic and atraumatic.   Eyes:      Extraocular Movements: Extraocular movements intact.      Conjunctiva/sclera: Conjunctivae normal.      Pupils: Pupils are equal, round, and reactive to light.   Pulmonary:      Effort: Pulmonary effort is normal.   Abdominal:      General: Abdomen is flat.      Palpations: Abdomen is soft.   Genitourinary:     General: Normal vulva.      Vagina: Normal.      Cervix: Normal.      Uterus: Normal.       Adnexa: Right adnexa normal and left adnexa normal.   Skin:     General: Skin is warm and dry.   Neurological:      General: No focal deficit present.      Mental Status: She is alert.   Psychiatric:         Mood and Affect: Mood normal.         Behavior: Behavior normal.         Thought Content: Thought content normal.         Judgment: Judgment normal.         Assessment/Plan  S/p hysteroscopy, D&C on 5/15/24.  Pathology reviewed with pt: benign, endometrial polyp and proliferative endometrium. Focal endometrial hyperplasia without atypia  This is a benign results but is abnormal for her. This suggests evidence of elevated estrogen exposure that is a risk factor for development of endometrial precancer or cancer.  This can be treated to help lower risk of progression with progesterone: PO or IUD. You should have repeat EMBs at 3 and 6 months.   There is also a place for surgical management with hysterectomy though I  would suggest this if you fail or decline medical management.  She is considering the options we discussed   will send Mirena PA for information on coverage to assist her in the decision making process    Scribe Attestation  By signing my name below, I, Finn Victor,   attest that this documentation has been prepared under the direction and in the presence of Nickolas Soto MD.

## 2024-05-31 ENCOUNTER — OFFICE VISIT (OUTPATIENT)
Dept: OBSTETRICS AND GYNECOLOGY | Facility: CLINIC | Age: 54
End: 2024-05-31
Payer: COMMERCIAL

## 2024-05-31 VITALS
DIASTOLIC BLOOD PRESSURE: 74 MMHG | BODY MASS INDEX: 38.18 KG/M2 | WEIGHT: 202.2 LBS | HEIGHT: 61 IN | SYSTOLIC BLOOD PRESSURE: 118 MMHG

## 2024-05-31 DIAGNOSIS — N85.00 ENDOMETRIAL HYPERPLASIA: Primary | ICD-10-CM

## 2024-05-31 PROCEDURE — 3008F BODY MASS INDEX DOCD: CPT | Performed by: OBSTETRICS & GYNECOLOGY

## 2024-05-31 PROCEDURE — 99213 OFFICE O/P EST LOW 20 MIN: CPT | Performed by: OBSTETRICS & GYNECOLOGY

## 2024-05-31 NOTE — PATIENT INSTRUCTIONS
Discussed plan of care in detail. Patient is in agreement and expressed understanding. All questions were answered.

## 2024-06-10 LAB
ATRIAL RATE: 63 BPM
P AXIS: 53 DEGREES
P OFFSET: 175 MS
P ONSET: 129 MS
PR INTERVAL: 188 MS
Q ONSET: 223 MS
QRS COUNT: 10 BEATS
QRS DURATION: 76 MS
QT INTERVAL: 398 MS
QTC CALCULATION(BAZETT): 407 MS
QTC FREDERICIA: 404 MS
R AXIS: 13 DEGREES
T AXIS: 47 DEGREES
T OFFSET: 422 MS
VENTRICULAR RATE: 63 BPM

## 2024-09-11 ENCOUNTER — TELEPHONE (OUTPATIENT)
Dept: OBSTETRICS AND GYNECOLOGY | Facility: CLINIC | Age: 54
End: 2024-09-11
Payer: COMMERCIAL

## 2024-11-08 ENCOUNTER — APPOINTMENT (OUTPATIENT)
Dept: OBSTETRICS AND GYNECOLOGY | Facility: CLINIC | Age: 54
End: 2024-11-08
Payer: COMMERCIAL

## 2024-11-08 VITALS
HEIGHT: 61 IN | SYSTOLIC BLOOD PRESSURE: 120 MMHG | DIASTOLIC BLOOD PRESSURE: 70 MMHG | BODY MASS INDEX: 38.51 KG/M2 | WEIGHT: 204 LBS

## 2024-11-08 DIAGNOSIS — Z01.419 WELL WOMAN EXAM WITH ROUTINE GYNECOLOGICAL EXAM: Primary | ICD-10-CM

## 2024-11-08 DIAGNOSIS — Z12.4 CERVICAL CANCER SCREENING: ICD-10-CM

## 2024-11-08 DIAGNOSIS — Z12.31 VISIT FOR SCREENING MAMMOGRAM: ICD-10-CM

## 2024-11-08 PROCEDURE — 99396 PREV VISIT EST AGE 40-64: CPT | Performed by: NURSE PRACTITIONER

## 2024-11-08 PROCEDURE — 3008F BODY MASS INDEX DOCD: CPT | Performed by: NURSE PRACTITIONER

## 2024-11-08 PROCEDURE — 87624 HPV HI-RISK TYP POOLED RSLT: CPT

## 2024-11-08 ASSESSMENT — ENCOUNTER SYMPTOMS
MUSCULOSKELETAL NEGATIVE: 1
CONSTITUTIONAL NEGATIVE: 1
ALLERGIC/IMMUNOLOGIC NEGATIVE: 1
RESPIRATORY NEGATIVE: 1
GASTROINTESTINAL NEGATIVE: 1
EYES NEGATIVE: 1
NEUROLOGICAL NEGATIVE: 1
PSYCHIATRIC NEGATIVE: 1
CARDIOVASCULAR NEGATIVE: 1
HEMATOLOGIC/LYMPHATIC NEGATIVE: 1
ENDOCRINE NEGATIVE: 1

## 2024-11-08 NOTE — PROGRESS NOTES
"Chief Complaint    Annual Exam        HPI    PAP 23 ASCUS HPV POS  COLPO 23 NEG  MAMMO 24  DEXA NEVER  COLON     CHAPERONE: DECLINED  Last edited by Benjy Ashford MA on 2024  9:43 AM.         54 y.o.  female presents for annual well woman exam.   She had hysteroscopy/D&C in May this year with Dr. Soto for PMB. Pathology focal endometrial hyperplasia (without atypia) in a background of endometrial polyp and proliferative pattern endometrium with tubal and squamous metaplasia. Recommendation was to return for repeat EMB at 3 and 6 months, she did not return yet for this. Treatment with progesterone was also discussed, which patient declines. She has not had any bleeding since.   She is sexually active with . Denies dyspareunia.   She denies any breast concerns.   H/O abnormal paps and +HPV-16. Last pap: 2023 ASCUS and +HPV-16. Colpo 2023 negative. Repeat pap today.  Denies pelvic pain.  Denies abnormal vaginal symptoms.  Denies urinary or bowel concerns. Colonoscopy: 2022 which showed polyps and is due to return at 3 years.   She is non-smoker  PMH: None  Family h/o breast cancer: None  Family h/o GYN cancer: None  Family h/o colon cancer: None  Works in IT, from home. First grandbaby born this year.     /70   Ht 1.549 m (5' 1\")   Wt 92.5 kg (204 lb)   LMP 2022 Comment: ucg negative  BMI 38.55 kg/m²      Current Outpatient Medications   Medication Instructions    calcium carbonate-vitamin D3 500 mg-5 mcg (200 unit) tablet 1 tablet, oral, Daily    cholecalciferol, vitamin D3, (VITAMIN D3 ORAL) oral    multivitamin tablet 1 tablet, oral, Daily    zinc sulfate (Zincate) 220 (50 Zn) MG capsule 50 mg of elemental zinc, oral, Daily        Review of Systems   Constitutional: Negative.    HENT: Negative.     Eyes: Negative.    Respiratory: Negative.     Cardiovascular: Negative.    Gastrointestinal: Negative.    Endocrine: Negative.    Genitourinary: Negative.  "   Musculoskeletal: Negative.    Skin: Negative.    Allergic/Immunologic: Negative.    Neurological: Negative.    Hematological: Negative.    Psychiatric/Behavioral: Negative.     All other systems reviewed and are negative.       Physical Exam  Constitutional:       Appearance: Normal appearance.   HENT:      Head: Normocephalic.      Nose: Nose normal.   Cardiovascular:      Rate and Rhythm: Normal rate and regular rhythm.   Pulmonary:      Effort: Pulmonary effort is normal.      Breath sounds: Normal breath sounds.   Chest:   Breasts:     Right: Normal.      Left: Normal.   Abdominal:      General: Abdomen is flat.      Palpations: Abdomen is soft.   Genitourinary:     General: Normal vulva.      Vagina: Normal.      Cervix: Normal.      Uterus: Normal.       Adnexa: Right adnexa normal and left adnexa normal.      Rectum: Normal.      Comments: Pap collected  Musculoskeletal:         General: Normal range of motion.      Cervical back: Normal range of motion and neck supple.   Skin:     General: Skin is warm and dry.   Neurological:      Mental Status: She is alert.   Psychiatric:         Mood and Affect: Mood normal.         Behavior: Behavior normal.          Assessment/Plan:   1. Well woman exam with routine gynecological exam (Primary)  -Pap test w/HPV testing collected today.   -Mammogram ordered. Self breast awareness exams reviewed.  -Discussed recommended follow up with Dr. Soto for repeat EMB. Will plan to await pap results in case a colpo is indicated again this year and EMB could be done at this time.  -Advised yearly well woman exams.   -Follow up sooner if needed.     2. Visit for screening mammogram  - BI mammo bilateral screening tomosynthesis; Future    3. Cervical cancer screening  - THINPREP PAP

## 2024-11-21 LAB

## 2024-12-14 NOTE — PROGRESS NOTES
HPI  Mare Hayes is a 54 y.o.  who presents for a repeat EMB in the setting of AUB and focal endometrial hyperplasia without atypia.  No acute concerns.    Hx 24: S/p hysteroscopy and D&C on 5/15/24.  Spotting and crampy lower pelvic pain both resolve a few days after procedure.   Fatigue for past two weeks.  Right sided hip pain with chiropractic care, they noted everything is inflamed. She has had to go for frequent alignments. She does this regularly but has had to go more than usual.  Pain is alleviated with rest, overall improving.  She thinks this may have happened on the operating table.    D&C Pathology: 5/15/24  Focal endometrial hyperplasia (without atypia) in a background of endometrial polyp and proliferative pattern endometrium with tubal and squamous metaplasia.     EMB Pathology: 2024  Benign endometrial surface epithelium, too scant for further evaluation.      TVUS 2024:  Uterus: Measuring 8.8 x 5.8 x 5.2 cm. 1.2 cm anterior lower uterine body fibroid. 1.3 cm posterior fibroid.  EMS: Shadowing could represent adenomyosis  Adnexa negative bilaterally     Last seen by me on 10/28/2019 for Colposcopy:  PAP 19 ASCUS/ +HPV (16)  No h/o +HPV pap. Previous paps were ASCUS or negative.   Feeling well today physically. Having a difficult time with this new HPV diagnosis due to social situation preceding this.    AUB hx: note by Conchita Martines on 2024    She had went just about 1 year with no period.     Started bleeding on . Has been having daily bleeding since.    Light flow, but bright red. Only needing panty liner.     Reports pelvic pressure and urinary urgency.     Denies abnormal vaginal discharge, itching, or odor.     She is sexually active with . Denies dyspareunia.    She had recent CBC, TSH with PCP which were normal.   Pap in 2023 ASCUS/+HRHPV. Colposcopy on 2023 with Dr. Lopes. ECC negative. H/O abnormal pap ASCUS, -   +HPV-16 with  "negative colposcopy in 10/2019. Last pap: 10/2020 and was negative and negative HPV.      PMH: None    No family h/o GYN related cancers or disorders.     Physical exam  /70   Ht 1.549 m (5' 1\")   Wt 93 kg (205 lb)   LMP 12/11/2022 Comment: ucg negative  BMI 38.73 kg/m²      Physical Exam  Constitutional:       Appearance: Normal appearance.   HENT:      Head: Normocephalic and atraumatic.   Eyes:      Extraocular Movements: Extraocular movements intact.      Conjunctiva/sclera: Conjunctivae normal.      Pupils: Pupils are equal, round, and reactive to light.   Pulmonary:      Effort: Pulmonary effort is normal.   Abdominal:      General: Abdomen is flat.      Palpations: Abdomen is soft.   Genitourinary:     General: Normal vulva.      Vagina: Normal.      Cervix: Normal.      Uterus: Normal.       Adnexa: Right adnexa normal and left adnexa normal.   Skin:     General: Skin is warm and dry.   Neurological:      General: No focal deficit present.      Mental Status: She is alert.   Psychiatric:         Mood and Affect: Mood normal.         Behavior: Behavior normal.         Thought Content: Thought content normal.         Judgment: Judgment normal.       Endometrial biopsy    Date/Time: 12/16/2024 11:45 AM    Performed by: Nickolas Soto MD  Authorized by: Nickolas Soto MD    Consent:     Consent obtained: verbal and written    Consent given by: patient    Patient agrees, verbalizes understanding, and wants to proceed: yes      Procedure explained and questions answered to patient or proxy's satisfaction: yes    Indications:     Indications: abnormal uterine bleeding    Pre-procedure:     Urine pregnancy test: negative    Procedure:     A bimanual exam was performed: yes      Prepped with: Betadine    Tenaculum used: yes      A local block was performed: yes      Block method: intracervical      Local anesthetic: lidocaine 1% w/o epi    Amount used (mL): 1    Number of passes: 3  Findings:     Cervix: " normal      Specimen collected: specimen collected and sent to pathology      Patient tolerance: tolerated well, no immediate complications        Assessment/Plan  EMB done today.  Patient tolerated procedure well.  RTC in 2 weeks for EMB results or sooner if any concerns.    Scribe Attestation  By signing my name below, Kary CHO Scribe,   attest that this documentation has been prepared under the direction and in the presence of Nickolas Soto MD.

## 2024-12-16 ENCOUNTER — APPOINTMENT (OUTPATIENT)
Dept: OBSTETRICS AND GYNECOLOGY | Facility: CLINIC | Age: 54
End: 2024-12-16
Payer: COMMERCIAL

## 2024-12-16 VITALS
DIASTOLIC BLOOD PRESSURE: 70 MMHG | BODY MASS INDEX: 38.71 KG/M2 | WEIGHT: 205 LBS | SYSTOLIC BLOOD PRESSURE: 120 MMHG | HEIGHT: 61 IN

## 2024-12-16 DIAGNOSIS — N93.9 ABNORMAL UTERINE BLEEDING (AUB): Primary | ICD-10-CM

## 2024-12-16 DIAGNOSIS — Z32.02 PREGNANCY TEST NEGATIVE: ICD-10-CM

## 2024-12-16 DIAGNOSIS — N85.00 ENDOMETRIAL HYPERPLASIA: ICD-10-CM

## 2024-12-16 LAB — PREGNANCY TEST URINE, POC: NEGATIVE

## 2024-12-16 PROCEDURE — 58100 BIOPSY OF UTERUS LINING: CPT | Performed by: OBSTETRICS & GYNECOLOGY

## 2024-12-16 PROCEDURE — 81025 URINE PREGNANCY TEST: CPT | Performed by: OBSTETRICS & GYNECOLOGY

## 2024-12-16 PROCEDURE — 88305 TISSUE EXAM BY PATHOLOGIST: CPT

## 2025-01-07 ENCOUNTER — TELEPHONE (OUTPATIENT)
Dept: OBSTETRICS AND GYNECOLOGY | Facility: CLINIC | Age: 55
End: 2025-01-07
Payer: COMMERCIAL

## 2025-01-11 NOTE — PROGRESS NOTES
HPI  Mare Hayes is a 54 y.o.  who presents for EMB results in the setting of AUB and focal endometrial hyperplasia without atypia. Now formally PMB.   No acute concerns.    Last menses 2023 only with procedural related bleeding after that time.  Prior to that was 2022, one week after amenorrhea x 1 year.    2024 EMB: benign, proliferative endometrium. Superficial sample, cannot r/o endometrial hyperplasia.     Hx 24:   S/p hysteroscopy and D&C on 5/15/24.  Spotting and crampy lower pelvic pain both resolve a few days after procedure.   Fatigue for past two weeks.  Right sided hip pain with chiropractic care, they noted everything is inflamed. She has had to go for frequent alignments. She does this regularly but has had to go more than usual.  Pain is alleviated with rest, overall improving.  She thinks this may have happened on the operating table.    D&C Pathology: 5/15/24  Focal endometrial hyperplasia (without atypia) in a background of endometrial polyp and proliferative pattern endometrium with tubal and squamous metaplasia.     EMB Pathology: 2024  Benign endometrial surface epithelium, too scant for further evaluation.      TVUS 2024:  Uterus: Measuring 8.8 x 5.8 x 5.2 cm. 1.2 cm anterior lower uterine body fibroid. 1.3 cm posterior fibroid.  EMS: Shadowing could represent adenomyosis  Adnexa negative bilaterally     Last seen by me on 10/28/2019 for Colposcopy:  PAP 19 ASCUS/ +HPV (16)  No h/o +HPV pap. Previous paps were ASCUS or negative.   Feeling well today physically. Having a difficult time with this new HPV diagnosis due to social situation preceding this.    AUB hx: note by Conchita Martines on 2024    She had went just about 1 year with no period.     Started bleeding on . Has been having daily bleeding since.    Light flow, but bright red. Only needing panty liner.     Reports pelvic pressure and urinary urgency.     Denies abnormal vaginal  discharge, itching, or odor.     She is sexually active with . Denies dyspareunia.    She had recent CBC, TSH with PCP which were normal.   Pap in 11/2023 ASCUS/+HRHPV. Colposcopy on 12/07/2023 with Dr. Lopes. ECC negative. H/O abnormal pap ASCUS, -   +HPV-16 with negative colposcopy in 10/2019. Last pap: 10/2020 and was negative and negative HPV.      PMH: None    No family h/o GYN related cancers or disorders.     Physical exam  LMP 12/11/2022 Comment: ucg negative     Physical Exam  Constitutional:       Appearance: Normal appearance.   HENT:      Head: Normocephalic and atraumatic.   Eyes:      Extraocular Movements: Extraocular movements intact.      Conjunctiva/sclera: Conjunctivae normal.      Pupils: Pupils are equal, round, and reactive to light.   Pulmonary:      Effort: Pulmonary effort is normal.   Skin:     General: Skin is dry.   Neurological:      General: No focal deficit present.      Mental Status: She is alert.   Psychiatric:         Mood and Affect: Mood normal.         Behavior: Behavior normal.         Thought Content: Thought content normal.         Judgment: Judgment normal.       Assessment/Plan  Reviewed path from EMB: benign, proliferative endometrium.  Superficial sample, cannot r/o endometrial hyperplasia.   Given postmenopausal status, this is benign (though limited) but abnormal for her clinically and is evidence of estrogen exposure and risk of progression to precancer/cancer.   Discussed management options: further characterization with D&C to r/o hyperplasia.  Medical management with progesterone (PO or IUD) or with serial EMBs vs surgical management with hysterectomy given risk factors if she desires to avoid further risk and further medical and serial EMB/D&Cs.   Could also opt for continued monitoring with repeat sampling though this is a less desirable option given risk factors.   She will consider her options and let me know what she decides.     Scribe Attestation  By  signing my name below, I, Finn Bernstein,   attest that this documentation has been prepared under the direction and in the presence of Nickolas Soto MD.

## 2025-01-13 ENCOUNTER — APPOINTMENT (OUTPATIENT)
Dept: OBSTETRICS AND GYNECOLOGY | Facility: CLINIC | Age: 55
End: 2025-01-13
Payer: COMMERCIAL

## 2025-01-13 VITALS
WEIGHT: 203 LBS | DIASTOLIC BLOOD PRESSURE: 72 MMHG | SYSTOLIC BLOOD PRESSURE: 112 MMHG | BODY MASS INDEX: 38.33 KG/M2 | HEIGHT: 61 IN

## 2025-01-13 DIAGNOSIS — N95.0 PMB (POSTMENOPAUSAL BLEEDING): Primary | ICD-10-CM

## 2025-01-13 PROCEDURE — 1036F TOBACCO NON-USER: CPT | Performed by: OBSTETRICS & GYNECOLOGY

## 2025-01-13 PROCEDURE — 99213 OFFICE O/P EST LOW 20 MIN: CPT | Performed by: OBSTETRICS & GYNECOLOGY

## 2025-01-13 PROCEDURE — 3008F BODY MASS INDEX DOCD: CPT | Performed by: OBSTETRICS & GYNECOLOGY

## 2025-01-28 ENCOUNTER — HOSPITAL ENCOUNTER (OUTPATIENT)
Dept: RADIOLOGY | Facility: CLINIC | Age: 55
Discharge: HOME | End: 2025-01-28
Payer: COMMERCIAL

## 2025-01-28 VITALS — BODY MASS INDEX: 38.33 KG/M2 | WEIGHT: 203 LBS | HEIGHT: 61 IN

## 2025-01-28 DIAGNOSIS — Z12.31 VISIT FOR SCREENING MAMMOGRAM: ICD-10-CM

## 2025-01-28 PROCEDURE — 77067 SCR MAMMO BI INCL CAD: CPT

## 2025-01-28 PROCEDURE — 77067 SCR MAMMO BI INCL CAD: CPT | Performed by: STUDENT IN AN ORGANIZED HEALTH CARE EDUCATION/TRAINING PROGRAM

## 2025-01-28 PROCEDURE — 77063 BREAST TOMOSYNTHESIS BI: CPT | Performed by: STUDENT IN AN ORGANIZED HEALTH CARE EDUCATION/TRAINING PROGRAM

## 2025-02-11 ENCOUNTER — APPOINTMENT (OUTPATIENT)
Dept: PRIMARY CARE | Facility: CLINIC | Age: 55
End: 2025-02-11
Payer: COMMERCIAL

## 2025-02-11 VITALS
OXYGEN SATURATION: 95 % | WEIGHT: 202 LBS | DIASTOLIC BLOOD PRESSURE: 87 MMHG | SYSTOLIC BLOOD PRESSURE: 124 MMHG | HEART RATE: 88 BPM | BODY MASS INDEX: 38.14 KG/M2 | HEIGHT: 61 IN

## 2025-02-11 DIAGNOSIS — Z12.11 COLON CANCER SCREENING: ICD-10-CM

## 2025-02-11 DIAGNOSIS — E53.8 VITAMIN B 12 DEFICIENCY: ICD-10-CM

## 2025-02-11 DIAGNOSIS — Z13.220 LIPID SCREENING: ICD-10-CM

## 2025-02-11 DIAGNOSIS — E66.09 CLASS 2 OBESITY DUE TO EXCESS CALORIES WITHOUT SERIOUS COMORBIDITY WITH BODY MASS INDEX (BMI) OF 38.0 TO 38.9 IN ADULT: ICD-10-CM

## 2025-02-11 DIAGNOSIS — R39.9 UTI SYMPTOMS: ICD-10-CM

## 2025-02-11 DIAGNOSIS — E66.812 CLASS 2 OBESITY DUE TO EXCESS CALORIES WITHOUT SERIOUS COMORBIDITY WITH BODY MASS INDEX (BMI) OF 38.0 TO 38.9 IN ADULT: ICD-10-CM

## 2025-02-11 DIAGNOSIS — Z00.00 WELLNESS EXAMINATION: Primary | ICD-10-CM

## 2025-02-11 DIAGNOSIS — E55.9 VITAMIN D DEFICIENCY: ICD-10-CM

## 2025-02-11 DIAGNOSIS — R63.8 UNABLE TO LOSE WEIGHT: ICD-10-CM

## 2025-02-11 PROCEDURE — 99396 PREV VISIT EST AGE 40-64: CPT | Performed by: NURSE PRACTITIONER

## 2025-02-11 PROCEDURE — 3008F BODY MASS INDEX DOCD: CPT | Performed by: NURSE PRACTITIONER

## 2025-02-11 PROCEDURE — 1036F TOBACCO NON-USER: CPT | Performed by: NURSE PRACTITIONER

## 2025-02-11 ASSESSMENT — PATIENT HEALTH QUESTIONNAIRE - PHQ9
2. FEELING DOWN, DEPRESSED OR HOPELESS: NOT AT ALL
SUM OF ALL RESPONSES TO PHQ9 QUESTIONS 1 AND 2: 0
1. LITTLE INTEREST OR PLEASURE IN DOING THINGS: NOT AT ALL

## 2025-02-11 NOTE — PROGRESS NOTES
"Subjective   Patient ID: Mare Hayes is a 54 y.o. female who presents for Annual Exam (Patient is here today for a physical, patient would like to discuss right ear itchiness. ).    54 year old female here for annual CPE. No acute concerns besides her weight and RT ear itching.   No recent illnesses. Afebrile.   Dry itchy skin on the shins  Obesity- states unable to lose weight    Prevention:  Breast Ca screen:Mamm-ordered by gynecology for yearly breast cancer screening. Mamm 1/2025  Colon Ca Screen: Colonoscopy done for colon cancer screening 11/2022.  Recommended for 3-year follow-up secondary to abnormal polyp  Lung Ca Screen: non smoker. No vape. No fam hx  CT Cardiac Score: Paternal grandfather- CVA  Diabetes Screen: no fam hx  Opthal: q year  Dental: q 6 months  Exercise: most days of the week- 3 mi a day, bike today was 12 mi, alternate with weights- per a video  Diet: caffeine- 2 C a day then water tea. Snacks through the day- veggies. Tried the Food Plate Method. Has been on Juice Plus  Work: - desk job                        Review of Systems    Objective   /87   Pulse 88   Ht 1.549 m (5' 0.98\")   Wt 91.6 kg (202 lb)   LMP 12/11/2022 Comment: ucg negative  SpO2 95%   BMI 38.19 kg/m²     Physical Exam  Vitals reviewed.   Constitutional:       General: She is not in acute distress.     Appearance: Normal appearance. She is obese.   HENT:      Head: Normocephalic and atraumatic.      Right Ear: Tympanic membrane and ear canal normal.      Left Ear: Tympanic membrane normal.      Ears:      Comments: Dry flaking cracked skin at the 4,5,6 O'clock position  Eyes:      Extraocular Movements: Extraocular movements intact.      Conjunctiva/sclera: Conjunctivae normal.      Pupils: Pupils are equal, round, and reactive to light.   Neck:      Vascular: No carotid bruit.   Cardiovascular:      Rate and Rhythm: Normal rate and regular rhythm.      Pulses: Normal pulses.      " Heart sounds: Normal heart sounds. No murmur heard.  Pulmonary:      Effort: Pulmonary effort is normal.      Breath sounds: Normal breath sounds.   Abdominal:      General: Bowel sounds are normal. There is no distension.      Palpations: Abdomen is soft.      Tenderness: There is no abdominal tenderness.   Musculoskeletal:         General: Normal range of motion.      Cervical back: Normal range of motion and neck supple.   Lymphadenopathy:      Cervical: No cervical adenopathy.   Skin:     General: Skin is warm and dry.   Neurological:      General: No focal deficit present.      Mental Status: She is alert and oriented to person, place, and time. Mental status is at baseline.   Psychiatric:         Mood and Affect: Mood normal.         Behavior: Behavior normal.         Thought Content: Thought content normal.         Assessment/Plan   Diagnoses and all orders for this visit:  Wellness examination  Comments:  utd omari screening. due for colonosocpy 11/15/25.  Orders:  -     CBC and Auto Differential; Future  -     Lipid Panel; Future  -     Comprehensive Metabolic Panel; Future  Unable to lose weight  -     TSH with reflex to Free T4 if abnormal; Future  Vitamin D deficiency  Comments:  not on supp. update lab  Orders:  -     Vitamin D 25-Hydroxy,Total (for eval of Vitamin D levels); Future  Vitamin B 12 deficiency  Comments:  not on supp. we discussed the risk vs benefit of low B 12  Orders:  -     Vitamin B12; Future  Lipid screening  -     Lipid Panel; Future  Class 2 obesity due to excess calories without serious comorbidity with body mass index (BMI) of 38.0 to 38.9 in adult  -     Lipid Panel; Future  -     Comprehensive Metabolic Panel; Future  -     Vitamin B12; Future  -     TSH with reflex to Free T4 if abnormal; Future  Colon cancer screening  -     Colonoscopy Screening; Average Risk Patient; Future  Other orders  -     Follow Up In Primary Care - Health Maintenance; Future

## 2025-02-17 LAB
25(OH)D3+25(OH)D2 SERPL-MCNC: 35 NG/ML (ref 30–100)
ALBUMIN SERPL-MCNC: 4.3 G/DL (ref 3.6–5.1)
ALP SERPL-CCNC: 66 U/L (ref 37–153)
ALT SERPL-CCNC: 23 U/L (ref 6–29)
ANION GAP SERPL CALCULATED.4IONS-SCNC: 10 MMOL/L (CALC) (ref 7–17)
APPEARANCE UR: CLEAR
AST SERPL-CCNC: 17 U/L (ref 10–35)
BACTERIA #/AREA URNS HPF: NORMAL /HPF
BACTERIA UR CULT: NORMAL
BASOPHILS # BLD AUTO: 41 CELLS/UL (ref 0–200)
BASOPHILS NFR BLD AUTO: 0.7 %
BILIRUB SERPL-MCNC: 0.6 MG/DL (ref 0.2–1.2)
BILIRUB UR QL STRIP: NEGATIVE
BUN SERPL-MCNC: 12 MG/DL (ref 7–25)
CALCIUM SERPL-MCNC: 10.1 MG/DL (ref 8.6–10.4)
CHLORIDE SERPL-SCNC: 101 MMOL/L (ref 98–110)
CHOLEST SERPL-MCNC: 220 MG/DL
CHOLEST/HDLC SERPL: 3.8 (CALC)
CO2 SERPL-SCNC: 27 MMOL/L (ref 20–32)
COLOR UR: YELLOW
CREAT SERPL-MCNC: 0.72 MG/DL (ref 0.5–1.03)
EGFRCR SERPLBLD CKD-EPI 2021: 99 ML/MIN/1.73M2
EOSINOPHIL # BLD AUTO: 378 CELLS/UL (ref 15–500)
EOSINOPHIL NFR BLD AUTO: 6.4 %
ERYTHROCYTE [DISTWIDTH] IN BLOOD BY AUTOMATED COUNT: 12.7 % (ref 11–15)
GLUCOSE SERPL-MCNC: 100 MG/DL (ref 65–99)
GLUCOSE UR QL STRIP: NEGATIVE
HCT VFR BLD AUTO: 45.7 % (ref 35–45)
HDLC SERPL-MCNC: 58 MG/DL
HGB BLD-MCNC: 15.3 G/DL (ref 11.7–15.5)
HGB UR QL STRIP: NEGATIVE
HYALINE CASTS #/AREA URNS LPF: NORMAL /LPF
KETONES UR QL STRIP: NEGATIVE
LDLC SERPL CALC-MCNC: 133 MG/DL (CALC)
LEUKOCYTE ESTERASE UR QL STRIP: NEGATIVE
LYMPHOCYTES # BLD AUTO: 2100 CELLS/UL (ref 850–3900)
LYMPHOCYTES NFR BLD AUTO: 35.6 %
MCH RBC QN AUTO: 29.9 PG (ref 27–33)
MCHC RBC AUTO-ENTMCNC: 33.5 G/DL (ref 32–36)
MCV RBC AUTO: 89.4 FL (ref 80–100)
MONOCYTES # BLD AUTO: 549 CELLS/UL (ref 200–950)
MONOCYTES NFR BLD AUTO: 9.3 %
NEUTROPHILS # BLD AUTO: 2832 CELLS/UL (ref 1500–7800)
NEUTROPHILS NFR BLD AUTO: 48 %
NITRITE UR QL STRIP: NEGATIVE
NONHDLC SERPL-MCNC: 162 MG/DL (CALC)
PH UR STRIP: 7.5 [PH] (ref 5–8)
PLATELET # BLD AUTO: 315 THOUSAND/UL (ref 140–400)
PMV BLD REES-ECKER: 10.4 FL (ref 7.5–12.5)
POTASSIUM SERPL-SCNC: 4.3 MMOL/L (ref 3.5–5.3)
PROT SERPL-MCNC: 6.9 G/DL (ref 6.1–8.1)
PROT UR QL STRIP: NEGATIVE
RBC # BLD AUTO: 5.11 MILLION/UL (ref 3.8–5.1)
RBC #/AREA URNS HPF: NORMAL /HPF
SERVICE CMNT-IMP: NORMAL
SODIUM SERPL-SCNC: 138 MMOL/L (ref 135–146)
SP GR UR STRIP: 1.02 (ref 1–1.03)
SQUAMOUS #/AREA URNS HPF: NORMAL /HPF
TRIGL SERPL-MCNC: 154 MG/DL
TSH SERPL-ACNC: 2.16 MIU/L
VIT B12 SERPL-MCNC: 573 PG/ML (ref 200–1100)
WBC # BLD AUTO: 5.9 THOUSAND/UL (ref 3.8–10.8)
WBC #/AREA URNS HPF: NORMAL /HPF

## 2025-02-18 DIAGNOSIS — Z12.11 COLON CANCER SCREENING: ICD-10-CM

## 2025-02-18 RX ORDER — POLYETHYLENE GLYCOL 3350, SODIUM SULFATE ANHYDROUS, SODIUM BICARBONATE, SODIUM CHLORIDE, POTASSIUM CHLORIDE 236; 22.74; 6.74; 5.86; 2.97 G/4L; G/4L; G/4L; G/4L; G/4L
POWDER, FOR SOLUTION ORAL
Qty: 4000 ML | Refills: 0 | Status: SHIPPED | OUTPATIENT
Start: 2025-02-18

## 2025-03-16 ENCOUNTER — OFFICE VISIT (OUTPATIENT)
Dept: URGENT CARE | Age: 55
End: 2025-03-16
Payer: COMMERCIAL

## 2025-03-16 VITALS
TEMPERATURE: 97.4 F | OXYGEN SATURATION: 96 % | WEIGHT: 198 LBS | SYSTOLIC BLOOD PRESSURE: 136 MMHG | DIASTOLIC BLOOD PRESSURE: 86 MMHG | HEART RATE: 104 BPM | BODY MASS INDEX: 37.44 KG/M2
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
## 2025-03-16 DIAGNOSIS — L03.90 CELLULITIS, UNSPECIFIED CELLULITIS SITE: ICD-10-CM

## 2025-03-16 PROCEDURE — 1036F TOBACCO NON-USER: CPT | Performed by: PHYSICIAN ASSISTANT

## 2025-03-16 PROCEDURE — 99213 OFFICE O/P EST LOW 20 MIN: CPT | Performed by: PHYSICIAN ASSISTANT

## 2025-03-16 PROCEDURE — 99070 SPECIAL SUPPLIES PHYS/QHP: CPT | Performed by: PHYSICIAN ASSISTANT

## 2025-03-16 RX ORDER — DOXYCYCLINE 100 MG/1
CAPSULE ORAL
Qty: 20 CAPSULE | Refills: 0 | Status: SHIPPED | OUTPATIENT
Start: 2025-03-16

## 2025-03-16 RX ORDER — DOXYCYCLINE 100 MG/1
100 CAPSULE ORAL 2 TIMES DAILY
Qty: 10 CAPSULE | Refills: 0 | Status: SHIPPED | OUTPATIENT
Start: 2025-03-16 | End: 2025-03-16

## 2025-03-16 NOTE — PROGRESS NOTES
Subjective   Patient ID: Mare Hayes is a 54 y.o. female. They present today with a chief complaint of Insect Bite (Bit by bug last Monday in [place] on right breast, she says it still feels like its stinging.).    History of Present Illness  Patient is a 54-year-old male who presents with insect bite that occurred several days ago while she was in [place].  States that she woke up with 3 bites on her chest.  States that the 1 over her right breast has been progressively worsening.  She has been putting hydrocortisone ointment on it but states that the pain has gotten so bad that it is now radiating throughout the entire right breast.  Denies any fevers, chills.    Past Medical History  Allergies as of 2025 - Reviewed 2025   Allergen Reaction Noted    Clindamycin Swelling 2023    Penicillins Hives 2017    Sulfa (sulfonamide antibiotics) Hives 2023       (Not in a hospital admission)       Past Medical History:   Diagnosis Date    Atypical squamous cells of undetermined significance on cytologic smear of cervix (ASC-US)     Pap smear abnormality of cervix with ASCUS favoring benign    Atypical squamous cells of undetermined significance on cytologic smear of cervix (ASC-US)     Pap smear abnormality of cervix with ASCUS favoring benign    Encounter for full-term uncomplicated delivery      (spontaneous vaginal delivery)    Encounter for gynecological examination (general) (routine) without abnormal findings 2017    Encounter for gynecological examination without abnormal finding    Flushing 2020    Hot flashes    Hot flashes 2023    Iron deficiency 2019    Low iron    Meniere's disease, unspecified ear     Meniere's disease    Meniere's disease, unspecified ear 10/25/2013    Meniere's disease    Otalgia, bilateral 2022    Otalgia of both ears    Other abnormal auditory perceptions, bilateral 2022    Other abnormal auditory perceptions,  bilateral    Personal history of other complications of pregnancy, childbirth and the puerperium     History of miscarriage    Personal history of other diseases of the female genital tract 09/24/2018    History of irregular menstrual cycles    Personal history of other diseases of the nervous system and sense organs     History of restless legs syndrome    Personal history of other diseases of the nervous system and sense organs 02/07/2022    History of ear pain    Personal history of other medical treatment 11/01/2017    History of screening mammography    Sleep apnea, unspecified     Mild sleep apnea    Unspecified otitis externa, right ear 11/03/2021    Right otitis externa    Varicella without complication     Varicella without complication       Past Surgical History:   Procedure Laterality Date    COLONOSCOPY      DILATION AND CURETTAGE OF UTERUS N/A     OTHER SURGICAL HISTORY  10/28/2019    Cervical biopsy procedure colposcopic        reports that she has never smoked. She has never used smokeless tobacco. She reports that she does not currently use alcohol. She reports that she does not use drugs.    Review of Systems  ROS is negative unless otherwise stated in HPI.         Objective    Vitals:    03/16/25 1741   BP: 136/86   BP Location: Left arm   Patient Position: Sitting   BP Cuff Size: Adult long   Pulse: 104   Temp: 36.3 °C (97.4 °F)   TempSrc: Oral   SpO2: 96%   Weight: 89.8 kg (198 lb)     Patient's last menstrual period was 12/11/2022.      VS: As documented in the triage note and EMR flowsheet from this visit was reviewed  General: Well appearing. No acute distress.   Eyes:  Extraocular movements grossly intact. No scleral icterus.   Head: Atraumatic. Normocephalic.     Neck: No meningismus. No gross masses. Full movement through range of motion  CV: Regular rhythm. No murmurs, rubs, gallops appreciated.   Resp: Clear to auscultation bilaterally. No respiratory distress.    Skin: Warm, dry. No  rashes.  3 insect bites to the chest.  The insect bite on the right breast has some surrounding erythema and is tender to palpation.  Neuro: CN II-VII intact. A&O x3. Speech fluent. Alert. Moving all extremities. Ambulates with normal gait  Psych: Appropriate mood and affect for situation      Point of Care Test & Imaging Results from this visit  No results found for this visit on 03/16/25.   No results found.    Diagnostic study results (if any) were reviewed by Ting Pruitt PA-C.    Assessment/Plan   Allergies, medications, history, and pertinent labs/EKGs/Imaging reviewed by Ting Pruitt PA-C.     Medical Decision Making  Patient is a 54-year-old female presents for insect bite.  States that she has been placing hydrocortisone ointment on this without relief.  Notes progressively worsening pain and erythema of the bite on the right breast.  On examination, has tenderness and erythema surrounding what appears to be a possible spider bite of the right breast.  Will cover for cellulitis with antibiotics.  Advised on Tylenol and Motrin for pain. Patient informed of the diagnosis.  They are agreeable to the plan as discussed above.  Patient given the opportunity to ask questions.  All of the patient's questions were answered. Given precautions in which to seek attention in the emergency department. Discussed follow up with PCP or other appropriate clinician.      Orders and Diagnoses  Diagnoses and all orders for this visit:  Bug bite, initial encounter  -     doxycycline (Vibramycin) 100 mg capsule; Take 1 tablet twice a day for the next 5 days.  Discard remainder medication at your local pharmacy.  Cellulitis, unspecified cellulitis site  -     doxycycline (Vibramycin) 100 mg capsule; Take 1 tablet twice a day for the next 5 days.  Discard remainder medication at your local pharmacy.      Medical Admin Record      Patient disposition: Home    Electronically signed by Ting Pruitt PA-C  6:27 PM

## 2025-03-31 ENCOUNTER — TELEPHONE (OUTPATIENT)
Dept: OBSTETRICS AND GYNECOLOGY | Facility: CLINIC | Age: 55
End: 2025-03-31
Payer: COMMERCIAL

## 2025-05-02 ENCOUNTER — APPOINTMENT (OUTPATIENT)
Dept: OBSTETRICS AND GYNECOLOGY | Facility: CLINIC | Age: 55
End: 2025-05-02
Payer: COMMERCIAL

## 2025-05-02 VITALS
BODY MASS INDEX: 37.95 KG/M2 | HEIGHT: 61 IN | SYSTOLIC BLOOD PRESSURE: 122 MMHG | DIASTOLIC BLOOD PRESSURE: 80 MMHG | WEIGHT: 201 LBS

## 2025-05-02 DIAGNOSIS — N95.0 PMB (POSTMENOPAUSAL BLEEDING): ICD-10-CM

## 2025-05-02 DIAGNOSIS — N93.9 ABNORMAL UTERINE BLEEDING DUE TO DISORDER OF ENDOMETRIUM: Primary | ICD-10-CM

## 2025-05-02 DIAGNOSIS — N85.9 ABNORMAL UTERINE BLEEDING DUE TO DISORDER OF ENDOMETRIUM: Primary | ICD-10-CM

## 2025-05-02 DIAGNOSIS — Z32.02 PREGNANCY TEST NEGATIVE: ICD-10-CM

## 2025-05-02 LAB — PREGNANCY TEST URINE, POC: NEGATIVE

## 2025-05-02 PROCEDURE — 81025 URINE PREGNANCY TEST: CPT | Performed by: OBSTETRICS & GYNECOLOGY

## 2025-05-02 PROCEDURE — 58100 BIOPSY OF UTERUS LINING: CPT | Performed by: OBSTETRICS & GYNECOLOGY

## 2025-05-02 RX ORDER — TRIAMCINOLONE ACETONIDE 5 MG/G
CREAM TOPICAL
COMMUNITY
Start: 2025-04-01 | End: 2025-05-02 | Stop reason: ALTCHOICE

## 2025-05-02 RX ORDER — MUPIROCIN 20 MG/G
OINTMENT TOPICAL
COMMUNITY
Start: 2025-04-01 | End: 2025-05-02 | Stop reason: ALTCHOICE

## 2025-05-02 NOTE — PROGRESS NOTES
EDGAR Hayes is a 54 y.o.  who presents for EMB in the setting of AUB and focal endometrial hyperplasia without atypia. Now formally postmenopausal.   No acute concerns.  No unprotected sex for > 2 wks prior to procedure.  No PMB. Denies bleeding.  Not on hormonal therapy as she has decline to date and was not on them at the time of AUB    Hx 2025: Last menses 2023 only with procedural related bleeding after that time.  Prior to that was 2022, one week after amenorrhea x 1 year.    Reviewed path from EMB: benign, proliferative endometrium.  Superficial sample, cannot r/o endometrial hyperplasia.   Given postmenopausal status, this is benign (though limited) but abnormal for her clinically and is evidence of estrogen exposure and risk of progression to precancer/cancer.   Discussed management options: further characterization with D&C to r/o hyperplasia.  Medical management with progesterone (PO or IUD) or with serial EMBs vs surgical management with hysterectomy given risk factors if she desires to avoid further risk and further medical and serial EMB/D&Cs.   Could also opt for continued monitoring with repeat sampling though this is a less desirable option given risk factors.   She will consider her options and let me know what she decides.     2024 EMB: benign, proliferative endometrium. Superficial sample, cannot r/o endometrial hyperplasia.     Hx 24:   S/p hysteroscopy and D&C on 5/15/24.  Spotting and crampy lower pelvic pain both resolve a few days after procedure.   Fatigue for past two weeks.  Right sided hip pain with chiropractic care, they noted everything is inflamed. She has had to go for frequent alignments. She does this regularly but has had to go more than usual.  Pain is alleviated with rest, overall improving.  She thinks this may have happened on the operating table.    D&C Pathology: 5/15/24  Focal endometrial hyperplasia (without atypia) in a background of  "endometrial polyp and proliferative pattern endometrium with tubal and squamous metaplasia.     EMB Pathology: 03/11/2024  Benign endometrial surface epithelium, too scant for further evaluation.      TVUS 01/2024:  Uterus: Measuring 8.8 x 5.8 x 5.2 cm. 1.2 cm anterior lower uterine body fibroid. 1.3 cm posterior fibroid.  EMS: Shadowing could represent adenomyosis  Adnexa negative bilaterally     Last seen by me on 10/28/2019 for Colposcopy:  PAP 9/30/19 ASCUS/ +HPV (16)  No h/o +HPV pap. Previous paps were ASCUS or negative.   Feeling well today physically. Having a difficult time with this new HPV diagnosis due to social situation preceding this.    AUB hx: note by Conchita Martines on 01/05/2024    She had went just about 1 year with no period.     Started bleeding on 12/21. Has been having daily bleeding since.    Light flow, but bright red. Only needing panty liner.     Reports pelvic pressure and urinary urgency.     Denies abnormal vaginal discharge, itching, or odor.     She is sexually active with . Denies dyspareunia.    She had recent CBC, TSH with PCP which were normal.   Pap in 11/2023 ASCUS/+HRHPV. Colposcopy on 12/07/2023 with Dr. Lopes. ECC negative. H/O abnormal pap ASCUS, -   +HPV-16 with negative colposcopy in 10/2019. Last pap: 10/2020 and was negative and negative HPV.      PMH: None    No family h/o GYN related cancers or disorders.     ROS notable for generally healthy, hot flashes  10 pt ROS negative except as listed above.      Physical exam  /80   Ht 1.549 m (5' 1\")   Wt 91.2 kg (201 lb)   LMP 12/11/2022 Comment: ucg negative  BMI 37.98 kg/m²        Endometrial biopsy    Date/Time: 5/2/2025 10:38 AM    Performed by: Nickolas Soto MD  Authorized by: Nickolas Soto MD    Consent:     Consent obtained: written and verbal    Consent given by: patient    Patient agrees, verbalizes understanding, and wants to proceed: yes      Procedure explained and questions answered to " patient or proxy's satisfaction: yes    Indications:     Indications: postmenopausal bleeding    Procedure:     Prepped with: Betadine    Local anesthetic: lidocaine 1% w/o epi  Findings:     Specimen collected: specimen collected and sent to pathology      Patient tolerance: tolerated well, no immediate complications      Assessment/Plan  EMB for PMB:  - Hcg neg today.  - EMB done today.  - Patient tolerated procedure well.    Scribe Attestation  By signing my name below, IKary Scribe,   attest that this documentation has been prepared under the direction and in the presence of Nickolas Soto MD.

## 2025-05-30 ENCOUNTER — TELEPHONE (OUTPATIENT)
Dept: OBSTETRICS AND GYNECOLOGY | Facility: CLINIC | Age: 55
End: 2025-05-30
Payer: COMMERCIAL

## 2025-05-30 NOTE — TELEPHONE ENCOUNTER
EMB 5/2025: Scant proliferative pattern endometrial epithelium, insufficient for complete characterization,   Apologized for delay in discussing results. I was awaiting f/up visit in office for discussion. We discussed by TC today instead.   Discussed incomplete characterization of EMB and limitation of EMB in general. We discuss the role of EMB in the workup of AUB  She odes have h/o endometrial hyperplasia without atypia.  Discjussed option to return to OR for hysteroscopy, D&C for improve characterization. Also option to RTC in event of PMB for further workup. Or options of serial EMB sample. Pt desires the latter. Plan repeat EMB after 3-4 months

## 2025-08-12 ENCOUNTER — OFFICE VISIT (OUTPATIENT)
Dept: PRIMARY CARE | Facility: CLINIC | Age: 55
End: 2025-08-12
Payer: COMMERCIAL

## 2025-08-12 VITALS
BODY MASS INDEX: 37.76 KG/M2 | SYSTOLIC BLOOD PRESSURE: 125 MMHG | HEART RATE: 73 BPM | DIASTOLIC BLOOD PRESSURE: 87 MMHG | OXYGEN SATURATION: 95 % | WEIGHT: 200 LBS | HEIGHT: 61 IN

## 2025-08-12 DIAGNOSIS — H66.90 ACUTE OTITIS MEDIA, UNSPECIFIED OTITIS MEDIA TYPE: Primary | ICD-10-CM

## 2025-08-12 PROCEDURE — 1036F TOBACCO NON-USER: CPT | Performed by: NURSE PRACTITIONER

## 2025-08-12 PROCEDURE — 3008F BODY MASS INDEX DOCD: CPT | Performed by: NURSE PRACTITIONER

## 2025-08-12 PROCEDURE — 99214 OFFICE O/P EST MOD 30 MIN: CPT | Performed by: NURSE PRACTITIONER

## 2025-08-12 RX ORDER — AZITHROMYCIN 250 MG/1
TABLET, FILM COATED ORAL
Qty: 6 TABLET | Refills: 0 | Status: SHIPPED | OUTPATIENT
Start: 2025-08-12 | End: 2025-08-17

## 2025-08-12 RX ORDER — NEOMYCIN SULFATE, POLYMYXIN B SULFATE, HYDROCORTISONE 3.5; 10000; 1 MG/ML; [USP'U]/ML; MG/ML
2 SOLUTION/ DROPS AURICULAR (OTIC) 4 TIMES DAILY
Qty: 10 ML | Refills: 0 | Status: SHIPPED | OUTPATIENT
Start: 2025-08-12 | End: 2025-08-19

## 2025-08-12 ASSESSMENT — PATIENT HEALTH QUESTIONNAIRE - PHQ9
SUM OF ALL RESPONSES TO PHQ9 QUESTIONS 1 AND 2: 0
2. FEELING DOWN, DEPRESSED OR HOPELESS: NOT AT ALL
1. LITTLE INTEREST OR PLEASURE IN DOING THINGS: NOT AT ALL

## 2025-08-12 ASSESSMENT — ENCOUNTER SYMPTOMS
DEPRESSION: 0
OCCASIONAL FEELINGS OF UNSTEADINESS: 0
LOSS OF SENSATION IN FEET: 0

## 2026-02-16 ENCOUNTER — APPOINTMENT (OUTPATIENT)
Dept: PRIMARY CARE | Facility: CLINIC | Age: 56
End: 2026-02-16
Payer: COMMERCIAL

## (undated) DEVICE — GLOVE, SURGICAL, PROTEXIS PI BLUE W/NEUTHERA, 6.0, PF, LF

## (undated) DEVICE — Device

## (undated) DEVICE — SOLUTION, INJECTION, USP, SODIUM CHLORIDE 0.9%, .9, NACL, 1000 ML, BAG

## (undated) DEVICE — COVER HANDLE LIGHT, STERIS, BLUE, STERILE

## (undated) DEVICE — GLOVE, SURGICAL, PROTEXIS PI , 5.5, PF, LF